# Patient Record
Sex: MALE | Race: WHITE
[De-identification: names, ages, dates, MRNs, and addresses within clinical notes are randomized per-mention and may not be internally consistent; named-entity substitution may affect disease eponyms.]

---

## 2019-08-05 ENCOUNTER — HOSPITAL ENCOUNTER (OUTPATIENT)
Dept: HOSPITAL 88 - OR | Age: 54
Discharge: HOME | End: 2019-08-05
Attending: ORTHOPAEDIC SURGERY
Payer: COMMERCIAL

## 2019-08-05 VITALS — DIASTOLIC BLOOD PRESSURE: 77 MMHG | SYSTOLIC BLOOD PRESSURE: 115 MMHG

## 2019-08-05 DIAGNOSIS — K51.90: ICD-10-CM

## 2019-08-05 DIAGNOSIS — R03.0: ICD-10-CM

## 2019-08-05 DIAGNOSIS — S83.241A: Primary | ICD-10-CM

## 2019-08-05 DIAGNOSIS — M89.9: ICD-10-CM

## 2019-08-05 PROCEDURE — 29882 ARTHRS KNE SRG MNISC RPR M/L: CPT

## 2019-08-05 PROCEDURE — 93005 ELECTROCARDIOGRAM TRACING: CPT

## 2019-08-05 NOTE — XMS REPORT
Continuity of Care Document

                             Created on: 2019



LONI GLEASON

External Reference #: [id number]

: 1965

Sex: Male



Demographics







                          Address                   [address] [place] [address] 

[place]  [address]

 

                          Home Phone                [phone]

 

                          Preferred Language        English

 

                          Marital Status            Unknown

 

                          Shinto Affiliation     Unknown

 

                          Race                      Unknown

 

                          Ethnic Group              Unknown





Author







                          Author                    QBE

 

                          Address                   Unknown

 

                          Phone                     Unavailable







Care Team Providers







                    Care Team Member Name    Role                Phone

 

                    [employer]    Unavailable         Unavailable



                                    



Problems

                    





                    Problem                            Status                            Onset Date     

                          Classification                            Date Reported       

                          Comments                            Source                    

 

                    Ulcerative colitis                                                        2019

                            Diagnosis                            2019          

                                                      [facility]                

    

 

                          Body mass index 25-29 - overweight                                                

                    2019                            Diagnosis                            2019

                                                        [facility]      

              

 

                    Depression screening                                                        2019

                            Diagnosis                            2019          

                                                      [facility]                

    

 

                    Acute meniscal tear, lateral                                                        

2019                            Diagnosis                            2019

                                                        [facility]      

              

 

                    Generalized anxiety disorder                                                        

2019                            Diagnosis                            2019

                                                        [facility]      

              

 

                    Chronic insomnia                                                        2019  

                          Diagnosis                            2019            

                                                      [facility]                  

  

 

                    Generalized Anxiety Disorder                                                        

2019                            Problem                            2019  

                                                      [facility]        

            

 

                    Ulcerative Colitis                                                        2019

                            Problem                            2019            

                                                      [facility]                  

  

 

                    Chronic Insomnia                                                        2019  

                          Problem                            2019              

                                                      [facility]                    



 

                    097 - OTH   UNSPEC SY                            Active                            2015

                                                                                       

                                         [facility] [place]                    

 

                    724.2 - LUMBAGO                            Active                            2015

                                                                                       

                                         [facility] [place]                    

 

                    Cough                            Active                                             

                    Problem                            2019                               

                                         [facility]                    

 

                    Sore throat                            Active                                       

                          Problem                            2019                       

                                                       [facility]                    

 

                          Elevated blood pressure reading                            Active                 

                                                Problem                            2019   

                                                       [facility]                

    

 

                    PND (Confirmed)                            Active                                   

                          Problem                            2019                   

                                                       [facility]                    

 

                    Sinusitis                            Active                                         

                          Problem                            2019                         

                                                       [facility]                    



                                                                                
                                                                                
                                                                                
                                                                     



Medications

                    





                    Medication                            Details                            Route      

                          Status                            Patient Instructions         

                          Ordering Provider                            Order Date           

                                        Source                    

 

                          benzonatate 200 MG Oral Capsule [Tessalon]                            200 mg=1 cap,

 PO, TID, X 10 day, # 30 cap, 0 Refill(s), Pharmacy: [facility]/pharmacy #[id number]         
                                                Active                                  

                                                      2019                         

                                         [facility]                    

 

                          Azithromycin 5 Day Dose Pack 250 mg oral tablet                            See Instructions,

 Take 2 tablets by mouth the first day then 1 tablet by mouth days 2-5., X 5 
day, # 6 tab, 0 Refill(s), Pharmacy: [facility]/pharmacy #[id number]                            

                            Active                                                   

                                                2019                            [facility]

                    

 

                                        Azelastine hydrochloride 0.137 MG/ACTUAT Metered Dose Nasal Spray [Astelin]     

                                        2 spray, NASAL, BID, # 1 ea, 0 Refill(s), Pharmacy: [facility]/pharmacy

 #[id number]                                                        Active                 

                                                                            2018          

                                        [facility]                    

 

                                        Codeine Phosphate 2 MG/ML / Guaifenesin 20 MG/ML Oral Solution                  

                                        10 mL, PO, Bedtime, PRN for cough, X 7 day, # 100 mL, 0 Refill(s)        

                                                      No Longer Active                     

                                                                            2018              

                                        [facility]                    

 

                          Oseltamivir 75 MG Oral Capsule [Tamiflu]                            75 mg, PO, Q12H,

 X 5 day, # 10 cap, 0 Refill(s)                                                    

                    No Longer Active                                                                   

                          2018                            [facility]              

      

 

                                        Azelastine hydrochloride 0.137 MG/ACTUAT Metered Dose Nasal Spray               

                                        azelastine 137 mcg (0.1 %) nasal spray aerosol Spray 1 spray every day

 by nasal route as needed for 30 days.                                             

                    Active                                                                      

                                                      [facility]                    



 

                          Escitalopram 10 MG Oral Tablet                            escitalopram 10 mg tablet

 take 1 tablet po daily                                                        Active

                                                                                       

                                        [facility]                    

 

                                        Acetaminophen 325 MG / Hydrocodone Bitartrate 5 MG Oral Tablet                  

                                        hydrocodone 5 mg-acetaminophen 325 mg tablet Take 1 tablet every 6 hours 

by oral route.                                                        Active         

                                                                                        

                                        [facility]                    

 

                          Prednisone 10 MG Oral Tablet                            prednisone 10 mg tablet Take

 1 tablet every day by oral route.                                                 

                    Active                                                                          

                                                      [facility]                    

 

                          Zolpidem tartrate 10 MG Oral Tablet                            zolpidem 10 mg tablet

 Take 1 tablet every day by oral route.                                            

                    [facility]                   

 



                                                                                
                                                                                
                                                                    



Allergies, Adverse Reactions, Alerts

                    





                    Substance                            Category                            Reaction   

                          Severity                            Reaction type           

                          Status                            Date Reported                     

                          Comments                            Source                    

 

                          No Known Medication Allergies                            Assertion                

                                                                            Drug allergy       

                                                                                       

                                        [facility]                    



                                                        



Immunizations

                    





                    Immunization                            Date Given                            Site  

                          Status                            Last Updated             

                          Comments                            Source                    

 

                          influenza virus vaccine, inactivated                            10/08/2018        

                                                completed                            [facility]             

       

 

                          influenza virus vaccine, inactivated                            10/01/2017        

                                                completed                            [facility]             

       



                                                             



Results







                                        No Data Provided for This Section



                    



Pathology Reports







                                        No Data Provided for This Section                    



                                                



Diagnostic Reports

                    





                    Report                            Value                            Date             

                                        Source                    

 

                          Spine lumbar wo contrast MRI                            MRI LUMBAR SPINE WITHOUT CONTRAST

 

 

COMPARISON: 2015 radiograph exam.

 

TECHNIQUE: Sagittal T1, sagittal T2 with fat saturation, axial T1 and axial T2 
images were obtained. No intravenous gadolinium was given.

 

FINDINGS: 

 

The paravertebral soft tissues are normal. 

 

The conus medullaris terminates at the L1 level. Multilevel thoracolumbar spine 
endplate Schmorl's nodes are identified, without marrow edema.

 

T12-L1: Unremarkable.

 

L1-L2: Unremarkable.

 

L2-L3: No central canal stenosis. There is mild bilateral foraminal and 
extraforaminal disc bulge with mild bilateral foraminal stenosis. No exiting 
nerve root impingement is present.

 

L3-L4: No central canal stenosis. Mild bilateral foraminal stenosis due to disc 
bulge encroachment is present. No significant mass effect on the exiting nerve 
roots.

 

L4-L5: Mild disc bulge is present. No central canal stenosis is present. Mild 
bilateral foraminal stenosis is present with disc bulge contacting the bilateral
 L4 exiting nerve roots.

 

L5-S1: Minimal disc bulge is seen without thecal sac stenosis. No foraminal 
stenosis.

 

 

 

IMPRESSION:

                                        1. Several levels of mild foraminal stenosis without significant mass effect on 

the exiting nerve roots. No focal disc herniation or significant central canal 
stenosis.

 

                            2015                             [facility]

                    

 

                          Spine lumbar 2 or 3 views DX                            EXAMINATION: Lumbar spine 

- 2 to 3 views

 

HISTORY: Lumbago.

 

FINDINGS: Frontal, lateral, and coned lateral views of the lumbar spine are 
performed without comparison.

 

The alignment is normal without listhesis. The intervertebral disc spaces are 
normal. The vertebral body heights are normal without compression fracture. The 
sacral ala appear intact.

 

IMPRESSION:

 

                                        1. Normal alignment and intervertebral disc spaces of the lumbar spine.

                            2015                             [facility] [place] 

                   



                                                                                
    



Consultation Notes

                    





                                        No Data Provided for This Section                    



                                                            



Discharge Summaries

                    





                                        No Data Provided for This Section                    



                                                            



History and Physicals

                    





                                        No Data Provided for This Section                    



                                                                



Vital Signs

                     





                    Vital Sign                            Value                            Date         

                          Comments                            Source                    

 

                    Weight                            106.455                             2019    

                                                       [facility]                 

   

 

                    Height                            190.5 cm                            2019    

                                                       [facility]                 

   

 

                    Heart Rate                            86                             2019     

                                                       [facility]                  

  

 

                    Respitory Rate                            16                             2019 

                                                        [facility]              

      

 

                    Temperature Oral (F)                            97.9 F                            2019

                                                         [facility]             

       

 

                    Systolic (mm Hg)                            151                             2019

                                                         [facility]             

       

 

                    Diastolic (mm Hg)                            94                             2019

                                                         [facility]             

       

 

                    BMI Calculated                            29.33                             2019

                                                         [facility]             

       

 

                    Diastolic (mm Hg)                            88                             2019

                                                        [facility]      

              

 

                    Height                            75                             2019         

                                                      [facility]               

     

 

                    Systolic (mm Hg)                            140                             2019

                                                        [facility]      

              

 

                    Weight                            222.6                             2019      

                                                      [facility]            

        

 

                    Systolic (mm Hg)                            110                             2018

                                                         [facility]             

       

 

                    Diastolic (mm Hg)                            73                             2018

                                                         [facility]             

       

 

                    Heart Rate                            96                             2018     

                                                       [facility]                  

  

 

                    Temperature Oral (F)                            98 F                            2018

                                                         [facility]             

       

 

                    BMI Calculated                            26.43                             2018

                                                         [facility]             

       

 

                    Weight                            95.909                             2018     

                                                       Medical Group                  

  

 

                    Height                            190.5 cm                            2018    

                                                       Medical Group                 

   



                                                                                
                                                                                
                                                                                
                                                                                
                                                        



Encounters

                    





                    Location                            Location Details                            Encounter

 Type                            Encounter Number                            Reason For

 Visit                            Attending Provider                            ADM Date

                            DC Date                            Status                

                                        Source                    

 

                          [facility] [place]                                              

                          Outpt Diag Services                            [id number]                

                                                Keyon Redduilar                             2015                                               

                                         [facility]                                          

                          Outpt Di[facility]                            [id number]            

                                                      Keyon Redduilar                          

                    2015                                    

                                        [facility] [place]                    

 

                                                                            Outpatient                  

                    [id number]                                                        RASHAAD QUEVEDO                             2016                                      

                          [facility]                 

   

 

                                                                            Outpatient                  

                    [id number]                                                        EVE HOWELL

                             2017                                              

                          [facility]                    

 

                                                                            Outpatient                  

                    [id number]                                                        EVE HOWELL

                             2018                                              

                          [facility]                                                     

                    Outpatient                            [id number]                                  

                                                        2018                       

                    01/10/2018                                                         [facility]

                    

 

                          [place] [facility]                               

                                        Keyon King MD: [address], [address], [place] [address], Ph. [phone]                            [id number]

                                                        Keyon King              

                    2019                                                              

                                        [facility]                    

 

                                                                            Outpatient                  

                    [id number]                                                        Eve Howell

                             2019                                              

                          [facility]                                                     

                    Outpatient                            [id number]                                  

                                                        2019                                                         [facility]

                    



                                                                                
                                                                                
        



Procedures

                    





                    Procedure                            Code                            Date           

                          Perfomer                            Comments                        

                                        Source                    

 

                          Knee joint operation                            [id number]                         

                                                                                                    

 Medical Group                    

 

                    Nose operation                            [id number]                                  

                                                                               Medical

 Group                    

 

                          Shoulder joint operations                            [id number]                    

                                                                                                   

                                         Medical Group                    

 

                    Tonsillectomy                            [id number]                                  

                                                                               Medical

 Group                    



                                                                                
                            



Assessment and Plan

                    





                                        No Data Provided for This Section                    



                                     



Plan of Care







                                        No Data Provided for This Section                    



                                                                



Social History

                    





                    Social History                            Date                            Source    

                

 

                                                                                    Smoking Status

             Never Smoker

                                   

                                 2019                            [facility]                    

 

                                        Social History TypeResponse

Substance Abuse

Use: None.

Exercise

Exercise frequency: Daily.

Employment/School

Status: Employed.  Work/School description: procurement.

Alcohol

Current

Smoking Status

Never smoker; Exposure to Tobacco Smoke None; Cigarette Smoking Last 365 Days 
No; Reg Smoking Cessation Counseling No

entered on: 2018                             Medical Group   

                 

 

                          No data available for this section                            2015          

                                         [facility] [place]                    

 

                          No data available for this section                            2015          

                                         [facility] [place]                    



                                                                                
                                    



Family History

                    





                                        No Data Provided for This Section                    



                                                            



Advance Directives

                    





                                        No Data Provided for This Section                    



                                                            



Functional Status

                    





                                        No Data Provided for This Section

## 2019-08-05 NOTE — XMS REPORT
Summary of Care: 7/27/15 - 7/27/15

                             Created on: 2112



LONI GLEASON

External Reference #: [id number]

: 1965

Sex: Male



Demographics







                          Address                   [address] [place]  [address]-

 

                          Home Phone                [phone]

 

                          Preferred Language        English

 

                          Marital Status            

 

                          Muslim Affiliation     Unknown

 

                          Race                      White/

 

                          Ethnic Group              Non-





Author







                          Author                    [facility]

 

                          Address                   Unknown

 

                          Phone                     Unavailable







Encounter





HQ Encntr_alias(FIN) [id number] Date(s): 7/27/15 - 7/27/15

[facility] [address] [place] [address]- [phone]

Discharge Disposition: Home

Attending Physician: Keyon King MD





Vital Signs





No data available for this section



Problem List





No data available for this section



Allergies, Adverse Reactions, Alerts





No data available for this section



Medications





No data available for this section



Results





No data available for this section



Immunizations





No data available for this section



Procedures





No data available for this section



Social History





No data available for this section



Assessment and Plan





No data available for this section

## 2019-08-05 NOTE — XMS REPORT
Summary of Care: 18 - 18

                             Created on: 2029



LONI GLEASON

External Reference #: [id number]

: 1965

Sex: Male



Demographics







                          Address                   [address] [place]  [address]-

 

                          Home Phone                [phone]

 

                          Preferred Language        English

 

                          Marital Status            Unknown

 

                          Zoroastrianism Affiliation     Unknown

 

                          Race                      White

 

                                        Additional Race(s)  

 

                          Ethnic Group              Non-





Author







                          Author                    [facility]

 

                          Organization              [facility] [place] [address]

 

                          Address                   Unknown

 

                          Phone                     Unavailable







Encounter





HQ Bernard_zack(FIN) [id number] Date(s): 18 - 18

[facility] [address] [facility] [address] [place] [address]- [phone]

Discharge Disposition: Home or Self Care





Vital Signs







 



                           Most recent to            1



                                         oldest [Reference 



                                         Range]: 

 

 



                           Height                    190.5 cm



                                         (18 3:08 PM)

 

 



                           Temperature Oral          98 DegF



                           [96.4-99.1 DegF]          (18 3:08 PM)

 

 



                           Blood Pressure            110/73 mmHg



                           [/60-90 mmHg]       (18 3:08 PM)

 

 



                           Peripheral Pulse          96 bpm



                           Rate [ bpm]         (18 3:08 PM)

 

 



                           Weight                    95.909 kg



                                         (18 3:08 PM)

 

 



                           Body Mass Index           26.43 m2



                                         (18 3:08 PM)







Problem List







    



              Condition     Effective Dates     Status       Health Status     Informant

 

    



                           Cough(Confirmed)          Active  

 

    



                           Sore                      Active  



                                         throat(Confirmed)    







Allergies, Adverse Reactions, Alerts







   



                 Substance       Reaction        Severity        Status

 

   



                           NKDA                      Active







Medications





Astelin 137 mcg/inh nasal spray

2 spray, NASAL, BID, # 1 ea, 0 Refill(s), Pharmacy: [facility]/pharmacy #[id number]

Start Date: 18

Stop Date: 18

Status: Ordered



codeine-guaiFENesin 10 mg-100 mg/5 mL oral syrup

10 mL, PO, Bedtime, PRN for cough, X 7 day, # 100 mL, 0 Refill(s)

Start Date: 18

Stop Date: 18

Status: Ordered



TamiFLU 75 mg oral capsule

75 mg, PO, Q12H, X 5 day, # 10 cap, 0 Refill(s)

Start Date: 18

Stop Date: 18

Status: Ordered



Results





No data available for this section



Immunizations





Given and Recorded





   



                 Vaccine         Date            Status          Refusal Reason

 

   



                     influenza virus vaccine, inactivated     10/1/17             Recorded 







Procedures







    



              Procedure     Date         Related Diagnosis     Body Site     Status

 

    



                           Knee joint operation        Completed

 

    



                           Nose operation            Completed

 

    



                           Shoulder joint operations        Completed

 

    



                           Tonsillectomy             Completed







Social History







 



                           Social History Type       Response

 

 



                           Substance Abuse           Use: None.

 

 



                           Exercise                  Exercise frequency: Daily.

 

 



                           Employment/School         Status: Employed.  Work/School description: procurement.

 

 



                           Alcohol                   Current

 

 



                           Smoking Status            Never smoker; Exposure to Tobacco Smoke None; Cigarette Smoking

 Last 365



                                         Days No; Reg Smoking Cessation Counseling No



                                         entered on: 18







Assessment and Plan





No data available for this section

## 2019-08-05 NOTE — XMS REPORT
Summary of Care: 3/12/15 - 3/12/15

                             Created on: 10/04/2033



LONI GLEASON

External Reference #: [id number]

: 1965

Sex: Male



Demographics







                          Address                   [address] [place] [address]  [address]

 

                          Home Phone                [phone]

 

                          Preferred Language        English

 

                          Marital Status            Unknown

 

                          Faith Affiliation     Unknown

 

                          Race                      White/

 

                          Ethnic Group              Non-





Author







                          Organization              Unknown

 

                          Address                   Unknown

 

                          Phone                     Unavailable







Encounter





HQ Encntr_alias(FIN) [id number] Date(s): 3/12/15 - 3/12/15

[facility] - [address] [phone]

Discharge Disposition: Home

Physician Attending: Keyon King MD





Vital Signs





No data available for this section



Problem List





No data available for this section



Allergies, Adverse Reactions, Alerts





No data available for this section



Medications





No data available for this section



Results





No data available for this section



Immunizations





No data available for this section



Procedures





No data available for this section



Social History





No data available for this section



Assessment and Plan





No data available for this section

## 2019-08-05 NOTE — XMS REPORT
Summary of Care: 19 - 19

                             Created on: 2087



LONI GLEASON

External Reference #: [id number]

: 1965

Sex: Male



Demographics







                          Address                   [address] [place] 

[place]  [address]

 

                          Home Phone                [phone]

 

                          Preferred Language        English

 

                          Marital Status            Unknown

 

                          Amish Affiliation     Unknown

 

                          Race                      White

 

                                        Additional Race(s)  

 

                          Ethnic Group              Non-





Author







                          Author                    [facility]

 

                          Organization              [facility] [address]

 

                          Address                   Unknown

 

                          Phone                     Unavailable







Encounter





HQ Bernard_zack(FIN) [id number] Date(s): 19 - 19

[facility] [address] [place] [address]- [phone]

Discharge Disposition: Home or Self Care





Vital Signs







 



                           Most recent to            1



                                         oldest [Reference 



                                         Range]: 

 

 



                           Height                    190.5 cm



                                         (19 1:51 PM)

 

 



                           Temperature Oral          97.9 DegF



                           [96.4-99.1 DegF]          (19 1:51 PM)

 

 



                           Blood Pressure            151/94 mmHg



                           [/60-90 mmHg]       *HI*



                                         (19 1:51 PM)

 

 



                           Respiratory Rate          16 BRMIN



                           [14-20 BRMIN]             (19 1:51 PM)

 

 



                           Peripheral Pulse          86 bpm



                           Rate [ bpm]         (19 1:51 PM)

 

 



                           Weight                    106.455 kg



                                         (19 1:51 PM)

 

 



                           Body Mass Index           29.33 m2



                                         (19 1:51 PM)







Problem List







    



              Condition     Effective Dates     Status       Health Status     Informant

 

    



                           Cough(Confirmed)          Active  

 

    



                           Elevated blood            Active  



                                         pressure    



                                         reading(Confirmed)    

 

    



                           PND (post-nasal           Active  



                                         drip)(Confirmed)    

 

    



                           Sinusitis(Confirmed)      Active  

 

    



                           Sore                      Active  



                                         throat(Confirmed)    







Allergies, Adverse Reactions, Alerts





No Known Medication Allergies



Medications





Azithromycin 5 Day Dose Pack 250 mg oral tablet

See Instructions, Take 2 tablets by mouth the first day then 1 tablet by mouth d
ays 2-5., X 5 day, # 6 tab, 0 Refill(s), Pharmacy: [facility]/pharmacy #[id number]

Start Date: 19

Stop Date: 19

Status: Ordered



Tessalon 200 mg oral capsule

200 mg=1 cap, PO, TID, X 10 day, # 30 cap, 0 Refill(s), Pharmacy: [facility]/pharmacy #
[id number]

Start Date: 19

Stop Date: 19

Status: Ordered



Results





No data available for this section



Immunizations





Given and Recorded





   



                 Vaccine         Date            Status          Refusal Reason

 

   



                     influenza virus vaccine, inactivated     10/8/18             Recorded 

 

   



                     influenza virus vaccine, inactivated     10/1/17             Recorded 







Procedures







    



              Procedure     Date         Related Diagnosis     Body Site     Status

 

    



                           Knee joint operation        Completed

 

    



                           Nose operation            Completed

 

    



                           Shoulder joint operations        Completed

 

    



                           Tonsillectomy             Completed







Social History







 



                           Social History Type       Response

 

 



                           Substance Abuse           Use: None.

 

 



                           Exercise                  Exercise frequency: Daily.

 

 



                           Employment/School         Status: Employed.  Work/School description: procurement.

 

 



                           Alcohol                   Current

 

 



                           Smoking Status            Never smoker; Exposure to Tobacco Smoke None; Cigarette Smoking

 Last 365



                                         Days No; Reg Smoking Cessation Counseling No



                                         entered on: 19







Assessment and Plan





No data available for this section

## 2019-08-05 NOTE — XMS REPORT
Patient Summary Document

                             Created on: 2019



LONI GLEASON

External Reference #: [id number]

: 1965

Sex: Male



Demographics







                          Address                   [address] [place] [address]  [address]

 

                          Home Phone                [phone]

 

                          Preferred Language        Unknown

 

                          Marital Status            Unknown

 

                          Scientologist Affiliation     Unknown

 

                          Race                      Unknown

 

                          Ethnic Group              Unknown





Author







                          Author                    [facility]

 

                          Address                   Unknown

 

                          Phone                     Unavailable







Care Team Providers







                    Care Team Member Name    Role                Phone

 

                          Unavailable               Unavailable







Problems

This patient has no known problems.



Allergies, Adverse Reactions, Alerts

This patient has no known allergies or adverse reactions.



Medications

This patient has no known medications.

## 2019-08-05 NOTE — XMS REPORT
Clinical Summary

                             Created on: 2019



Pieter Thomas

External Reference #: [id number]

: 1965

Sex: Male



Demographics







                          Address                   [address] 

[place]  [address]

 

                          Home Phone                [phone]

 

                          Preferred Language        English

 

                          Marital Status            

 

                          Pentecostal Affiliation     [id number]

 

                          Race                      White

 

                          Ethnic Group              Non-





Author







                          Author                    [facility]ell [facility]

 

                          Organization              [facility]

 

                          Address                   Unknown

 

                          Phone                     Unavailable







Support







                Name            Relationship    Address         Phone

 

                    Urvashi Thomas        [employer]                [address]

[place]  [address]                      [phone]







Care Team Providers







                    Care Team Member Name    Role                Phone

 

                    Keyon King MD    PCP                 [phone]







Allergies

No Known Allergies



Medications







                          End Date                  Status



              Medication     Sig          Dispensed     Refills      Start  



                                         Date  

 

                                                    Active



                 APRISO 0.375 gram 24 hr     TAKE 4          3                 



                     capsule             CAPSULES BY         6  



                                         MOUTH EVERY     



                                         DAY     

 

                                                    Active



                 HYDROcodone-acetaminophen     TAKE 1 TABLET      0                 



                     (XODOL) 5-300 mg per     BY MOUTH            8  



                           tablet                    EVERY 4 TO 6     



                                         HOURS AS     



                                         NEEDED FOR     



                                         PAIN     

 

                                                    Active



              tadalafil (CIALIS) 20 mg     Take 1 tablet     27 tablet     2              



                     tablet              (20 mg total)       8  



                                         by mouth     



                                         daily as     



                                         needed for     



                                         erectile     



                                         dysfunction.     

 

                                                    Active



              cetirizine-pseudoepHEDrin     Take 1 tablet     60 tablet     11             



                     e (ZyrTEC-D) 5-120 mg per     by mouth 2          8  



                           12 hr tablet              (two) times a     



                                         day.     

 

                                                    Active



              atorvastatin (LIPITOR) 20     TAKE 1 TABLET     90 tablet     1              



                     MG tablet           BY MOUTH            8  



                                         EVERY DAY     

 

                                                    Active



              dextromethorphan-guaifene     Take 1 tablet     28 each      0              



                     sin (MUCINEX DM)      by mouth 2          8  



                           mg tablet extended        (two) times a     



                           release 12 hr             day as needed     



                                         (congestion     



                                         post nasal     



                                         drip).     

 

                          2019                Active



              clotrimazole-betamethason     Apply        30 g         0              



                     e (LOTRISONE) 1-0.05 %     topically 2         8  



                           cream                     (two) times a     



                                         day.     

 

                          2018                Discontinued



              fluticasone (FLONASE) 50     USE 2 SPRAYS     48 mL        1              



                     mcg/actuation nasal spray     EACH NOSTRIL        7  



                                         EVERYDAY     

 

                          2018                Discontinued



              atorvastatin (LIPITOR) 20     TAKE 1 TABLET     90 tablet     0            05/15/201  



                     MG tablet           BY MOUTH            8  



                                         EVERY DAY     

 

                          2018                Discontinued



              dextromethorphan-guaifene     Take 1 tablet     28 each      0              



                     sin (MUCINEX DM)      by mouth 2          8  



                           mg tablet extended        (two) times a     



                           release 12 hr             day as needed     



                                         (congestion     



                                         post nasal     



                                         drip).     

 

                          2018                Discontinued



              zolpidem (AMBIEN) 10 mg     TAKE 1 TABLET     30 tablet     0              



                     tablet              BY MOUTH            8  



                                         NIGHTLY AS     



                                         NEEDED FOR     



                                         SLEEP     

 

                          2018                Discontinued



              zolpidem (AMBIEN) 10 mg     TAKE 1 TABLET     30 tablet     0              



                     tablet              BY MOUTH            8  



                                         NIGHTLY AS     



                                         NEEDED FOR     



                                         SLEEP     

 

                          2018                Discontinued



                     UNABLE TO FIND      Zirtec D            0   

 

                          10/09/2018                Discontinued



              escitalopram (LEXAPRO) 10     Take 1 tablet     30 tablet     2              



                     MG tablet           (10 mg total)       8  



                                         by mouth     



                                         daily for 90     



                                         days.     

 

                          2018                Discontinued



              clonAZEPAM (KlonoPIN) 0.5     Take 1 tablet     40 tablet     0              



                     MG tablet           (0.5 mg             8  



                                         total) by     



                                         mouth 3     



                                         (three) times     



                                         a day as     



                                         needed for     



                                         anxiety     



                                         (insomnia)     



                                         for up to 30     



                                         days.     

 

                          2018                



              promethazine-codeine     Take 5 mL by     120 mL       0              



                     (PHENERGAN with CODEINE)     mouth every 6       8  



                           6.25-10 mg/5 mL syrup     (six) hours     



                                         as needed for     



                                         cough for up     



                                         to 10 days.     

 

                          10/09/2018                Discontinued



              dextromethorphan-guaifene     Take 1 tablet     28 each      0              



                     sin (MUCINEX DM)      by mouth 2          8  



                           mg tablet extended        (two) times a     



                           release 12 hr             day as needed     



                                         (congestion     



                                         post nasal     



                                         drip).     

 

                          2018                



              levoFLOXacin (LEVAQUIN)     Take 1 tablet     5 tablet     0              



                     750 MG tablet       (750 mg             8  



                                         total) by     



                                         mouth daily     



                                         for 5 days.     

 

                          2018                Discontinued



              atorvastatin (LIPITOR) 20     TAKE 1 TABLET     90 tablet     0            /201  



                     MG tablet           BY MOUTH            8  



                                         EVERY DAY     

 

                          2018                Discontinued



              clonAZEPAM (KlonoPIN) 0.5     TAKE 1 TABLET     40 tablet     0            201  



                     MG tablet           BY MOUTH 3          8  



                                         TIMES A DAY     



                                         AS NEEDED FOR     



                                         ANXIETY/INSOM     



                                         MAHENDRA     

 

                          10/20/2018                



              clonAZEPAM (KlonoPIN) 0.5     TAKE 1 TABLET     30 tablet     0              



                     MG tablet           BY MOUTH 3          8  



                                         TIMES A DAY     



                                         AS NEEDED FOR     



                                         ANXIETY/INSOM     



                                         MAHENDRA     

 

                          2018                Discontinued



                     amoxicillin (AMOXIL) 500       0                   10/08/201  



                           MG capsule                8  

 

                          10/14/2018                



              azithromycin (ZITHROMAX)     Take 2       6 tablet     0            10/09/201  



                     250 MG tablet       tablets the         8  



                                         first day,     



                                         then 1 tablet     



                                         daily for 4     



                                         days.     

 

                          2018                Discontinued



              dextromethorphan-guaifene     Take 1 tablet     28 each      0            10/09/201  



                     sin (MUCINEX DM)      by mouth 2          8  



                           mg tablet extended        (two) times a     



                           release 12 hr             day as needed     



                                         (congestion     



                                         post nasal     



                                         drip).     

 

                          10/19/2018                



              promethazine-codeine     Take 5 mL by     120 mL       0            10/09/201  



                     (PHENERGAN with CODEINE)     mouth every 6       8  



                           6.25-10 mg/5 mL syrup     (six) hours     



                                         as needed for     



                                         cough for up     



                                         to 10 days.     

 

                          2018                Discontinued



              escitalopram (LEXAPRO) 10     Take 1 tablet     30 tablet     2            10/09/201  



                     MG tablet           (10 mg total)       8  



                                         by mouth     



                                         daily for 90     



                                         days.     

 

                          2018                Discontinued



              escitalopram (LEXAPRO) 10     Take 1 tablet     90 tablet     0              



                     MG tablet           (10 mg total)       8  



                                         by mouth     



                                         daily for 90     



                                         days.     

 

                          2018                



              clonAZEPAM (KlonoPIN) 0.5     Take 1 tablet     30 tablet     1              



                     MG tablet           (0.5 mg             8  



                                         total) by     



                                         mouth 2 (two)     



                                         times a day     



                                         as needed for     



                                         anxiety     



                                         (insomnia)     



                                         for up to 30     



                                         days.     

 

                          2018                



              mupirocin (BACTROBAN) 2 %     Apply        30 g         0              



                     ointment            topically to        8  



                                         left nostril     



                                         twice a day     

 

                          2018                Discontinued



              mometasone (ELOCON) 0.1 %     Apply        30 g         0              



                     cream               topically to        8  



                                         left leg rash     



                                         once daily     

 

                          2018                



              ciprofloxacin (CIPRO) 500     Take 1 tablet     16 tablet     0              



                     MG tablet           (500 mg             8  



                                         total) by     



                                         mouth 2 (two)     



                                         times a day     



                                         for 8 days.     

 

                          2018                



              metroNIDAZOLE (FLAGYL)     Take 1 tablet     24 tablet     0              



                     500 MG tablet       (500 mg             8  



                                         total) by     



                                         mouth 3     



                                         (three) times     



                                         a day for 8     



                                         days.     

 

                          2018                



              acetaminophen-codeine     Take 1 tablet     5 tablet     0              



                     (TYLENOL WITH CODEINE #3)     by mouth            8  



                           300-30 mg per tablet      every 6 (six)     



                                         hours as     



                                         needed for     



                                         moderate pain     



                                         for up to 5     



                                         doses.     

 

                          2019                



              azithromycin (ZITHROMAX)     Take 2       6 tablet     0              



                     250 MG tablet       tablets the         8  



                                         first day,     



                                         then 1 tablet     



                                         daily for 4     



                                         days.     

 

                          2019                



              promethazine-codeine     Take 5 mL by     120 mL       0              



                     (PHENERGAN with CODEINE)     mouth every 6       8  



                           6.25-10 mg/5 mL syrup     (six) hours     



                                         as needed for     



                                         cough for up     



                                         to 10 days.     

 

                          2019                



              escitalopram (LEXAPRO) 10     Take 1 tablet     90 tablet     1              



                     MG tablet           (10 mg total)       8  



                                         by mouth     



                                         daily for 90     



                                         days.     

 

                          2019                



              zolpidem (AMBIEN) 10 mg     Take 1 tablet     30 tablet     1              



                     tablet              (10 mg total)       8  



                                         by mouth     



                                         nightly as     



                                         needed for     



                                         sleep for up     



                                         to 30 days.     









                                        Status



            Hospital, Clinic, or     Ordered Dose     Route      Frequency     Start      End Date 



                           Other Facility            Date  



                                         Administered Medication      

 

                                        Ended



            methylPREDNISolone     40 mg      IM         once       20 



                     acetate (DEPO-MEDROL)        18                  8 



                                         injection 40      



                                         mgIndications: Acute      



                                         frontal sinusitis,      



                                         recurrence not specified      

 

                                        Ended



            methylPREDNISolone     40 mg      IM         once       10/09/20     10/09/201 



                     acetate (DEPO-MEDROL)        18                  8 



                                         injection 40      



                                         mgIndications: Acute      



                                         bronchitis, unspecified      



                                         organism      







Active Problems







 



                           Problem                   Noted Date

 

 



                           Colitis                   12/10/2018

 

 



                           Left tennis elbow         05/15/2017

 

 



                           Right elbow pain          2017

 

 



                           Lateral epicondylitis of right elbow     2017

 

 



                           Lateral epicondylitis of both elbows     10/03/2016







Encounters







                          Care Team                 Description



                     Date                Type                Specialty  

 

                                        



Keyon King MD                



                     2019          Refill              Family Medicine  

 

                                        



Keyon King MD                



                     2019          Refill              Family Medicine  

 

                                        



Keyon King MD                



                     2019          Refill              Family Medicine  

 

                                        



Keyon King MD               Psychophysiological insomnia (Primary Dx); 

Tinea corporis; 

Acute secretory otitis media of both ears; 

Anemia due to other cause, not classified; 

Allergic rhinitis, unspecified seasonality, unspecified trigger; 

Panic attack as reaction to stress; 

Mixed hyperlipidemia; 

Diverticulitis of large intestine without perforation or abscess without 
bleeding; 

Ulcerative colitis without complications, unspecified location (HCC); 

Weight loss; 

BMI 27.0-27.9,adult



                     2018          Office Visit        Family Medicine  

 

                                        



Aleksey Tan MD Bhatt, Sandeep, MD                      Colitis (Primary Dx)



                     2018          [facility]  



                           -                         Encounter   



                                         2018    

 

                                        



Keyon King MD                



                     2018          Refill              Family Medicine  

 

                                        



Rama Luna MA                       



                     2018          Telephone           [facility] Keyon Moy MD               Elevated transaminase level (Primary Dx)



                     2018          Orders Only         Family Keyon Moy MD               Routine check-up (Primary Dx); 

Mixed hyperlipidemia; 

Panic attack as reaction to stress; 

Dermatitis; 

Cellulitis of nostril; 

Psychophysiological insomnia; 

BMI 27.0-27.9,adult; 

Elevated blood pressure reading without diagnosis of hypertension; 

Erectile dysfunction, unspecified erectile dysfunction type; 

Chronic sinusitis, unspecified location; 

Lateral epicondylitis of both elbows; 

Acute secretory otitis media of both ears; 

Pharyngitis, unspecified etiology; 

Nocturia; 

Drug-induced constipation; 

Weight gain, abnormal



                     2018          Office Visit        Family Rama Morocho MA                       



                     2018          Telephone           [facility] Keyon Moy MD                



                     10/15/2018          Refill              Family Keyon Moy MD               Acute bronchitis, unspecified organism (Primary Dx); 

Pharyngitis, unspecified etiology; 

Other fatigue; 

Panic attack as reaction to stress; 

Insomnia, unspecified type; 

Other specified anxiety disorders; 

Left ear pain



                     10/09/2018          Office Visit        Family Keyon Moy MD                



                     2018          Refill              Family Keyon Moy MD                



                     2018          Refill              [facility] Keyon Moy MD                



                     2018          Telephone           [facility] Keyon Moy MD                



                     2018          Refill              Family Medicine  

 

                                        



Keyon King MD                



                     2018          Telephone           Family Medicine  

 

                                        



Keyon Kign MD               Acute frontal sinusitis, recurrence not specified (Primary

 Dx); 

Acute secretory otitis media of both ears; 

Pharyngitis, unspecified etiology; 

Anxiety; 

Psychophysiological insomnia; 

Panic attacks; 

Other fatigue



                     2018          Office Visit        Family Medicine  

 

                                        



Keyon King MD                



                     2018          Refill              Family Medicine  



after 2018



Family History







   



                 Medical History     Relation        Name            Comments

 

   



                           Diabetes                  Brother  

 

   



                           Hypertension              Brother  

 

   



                     Diabetes            Father              Flo Thomas 

 

   



                     Hearing loss        Father              Flo Thomas 

 

   



                     Heart disease       Father              Flo Thomas 

 

   



                 Stroke          Father          Flo          2002



                                         [place] 

 

   



                 Cancer          Mother          4[address] Stage       2015



                                         Breast 



                                         Cancer 

 

   



                     Diabetes            Paternal            Lenin Thomas 



                                         Grandfather  

 

   



                 Stroke          Paternal        Lenin Thomas     1976



                                         Grandfather  

 

   



                 Miscarriages /     Paternal        Alsysa       late 1930's



                     Stillbirths         Grandmother         William 

 

   



                 Vision loss     Paternal        Alyssa       Legally blind



                           Grandmother               William 









   



                 Relation        Name            Status          Comments

 

   



                                         Brother   

 

   



                           Father                    Flo Thomas  

 

   



                           Mother                    4th Stage  



                                         Breast Cancer  

 

   



                           Paternal Grandfather      Phelpsdada Thomas  

 

   



                           Paternal Grandmother      Alyssa Thomas  







Social History







                                        Date



                 Tobacco Use     Types           Packs/Day       Years Used 

 

                                         



                                         Never Smoker    

 

    



                                         Smokeless Tobacco: Never   



                                         Used   









   



                 Alcohol Use     Drinks/Week     oz/Week         Comments

 

   



                     Yes                 2 Glasses of        Socially



                                         wine  



                                         2 Cans of  



                                         beer  









 



                           Sex Assigned at Birth     Date Recorded

 

 



                                         Not on file 









                                        Industry



                           Job Start Date            Occupation 

 

                                        Not on file



                           Not on file               Not on file 









                                        Travel End



                           Travel History            Travel Start 

 





                                         No recent travel history available.







Last Filed Vital Signs







                                        Time Taken



                           Vital Sign                Reading 

 

                                        2018  9:40 AM CST



                           Blood Pressure            136/88 

 

                                        2018  9:40 AM CST



                           Pulse                     98 

 

                                        2018  9:40 AM CST



                           Temperature               36.7 C (98.1 F) 

 

                                        2018 10:49 AM CST



                           Respiratory Rate          15 

 

                                        2018  9:40 AM CST



                           Oxygen Saturation         100% 

 

                                        -



                           Inhaled Oxygen            - 



                                         Concentration  

 

                                        2018  9:40 AM CST



                           Weight                    99.8 kg (220 lb) 

 

                                        2018  9:40 AM CST



                           Height                    190.5 cm (6' 3") 

 

                                        2018  9:40 AM CST



                           Body Mass Index           27.5 







Plan of Treatment







   



                 Health Maintenance     Due Date        Last Done       Comments

 

   



                           COLONOSCOPY SCREENING     2017  

 

   



                           SHINGLES VACCINES (#1)     2017  

 

   



                           INFLUENZA VACCINE         2019  







Procedures







                                        Comments



                 Procedure Name     Priority        Date/Time       Associated Diagnosis 

 

                                        



Results for this procedure are in the results section.



                     HC COMPLETE BLD COUNT     Routine             2018  



                           W/AUTO DIFF               5:19 AM CST  

 

                                        



Results for this procedure are in the results section.



                     ESTIMATED GFR       Routine             2018  



                                         5:36 AM CST  

 

                                        



Results for this procedure are in the results section.



                     BASIC METABOLIC PANEL     Routine             2018  



                                         5:36 AM CST  

 

                                        



Results for this procedure are in the results section.



                     HC COMPLETE BLD COUNT     Routine             2018  



                           W/AUTO DIFF               5:36 AM CST  

 

                                        



Results for this procedure are in the results section.



                     GASTROINTESTINAL PANEL     Routine             12/10/2018  



                                         11:52 AM CST  

 

                                        



Results for this procedure are in the results section.



                     ESTIMATED GFR       Routine             12/10/2018  



                                         5:02 AM CST  

 

                                        



Results for this procedure are in the results section.



                     COMPREHENSIVE METABOLIC     Routine             12/10/2018  



                           PANEL                     5:02 AM CST  

 

                                        



Results for this procedure are in the results section.



                     HC COMPLETE BLD COUNT     Routine             12/10/2018  



                           W/AUTO DIFF               5:02 AM CST  

 

                                        



Results for this procedure are in the results section.



                     CT ABDOMEN PELVIS W     STAT                2018  



                           CONTRAST                  11:59 PM CST  

 

                                        



Results for this procedure are in the results section.



                     ECG 12-LEAD         Routine             2018  



                                         10:16 PM CST  

 

                                        



Results for this procedure are in the results section.



                     ECG ED PRELIMINARY     Routine             2018  



                           INTERPRETATION            10:14 PM CST  

 

                                        



Results for this procedure are in the results section.



                     ESTIMATED GFR       STAT                2018  



                                         9:46 PM CST  

 

                                        



Results for this procedure are in the results section.



                     URINALYSIS SCREEN AND     STAT                2018  



                           MICROSCOPY, WITH REFLEX      9:46 PM CST  



                                         TO CULTURE    

 

                                        



Results for this procedure are in the results section.



                     LIPASE LEVEL        STAT                2018  



                                         9:46 PM CST  

 

                                        



Results for this procedure are in the results section.



                     AMYLASE LEVEL       STAT                2018  



                                         9:46 PM CST  

 

                                        



Results for this procedure are in the results section.



                     COMPREHENSIVE METABOLIC     STAT                2018  



                           PANEL                     9:46 PM CST  

 

                                        



Results for this procedure are in the results section.



                     HC COMPLETE BLD COUNT     STAT                2018  



                           W/AUTO DIFF               9:46 PM CST  

 

                                        



Results for this procedure are in the results section.



                 URINALYSIS, COMPLETE,     Routine         2018      Erectile dysfunction, 



                     WITH REFLEX TO CULTURE      11:12 AM CST        unspecified erectile 



                                         dysfunction type 

 

                                        



Results for this procedure are in the results section.



                 TESTOSTERONE LEVEL, FREE     Routine         2018      Erectile dysfunction, 



                     AND TOTAL, MALE      11:12 AM CST        unspecified erectile 



                                         dysfunction type 

 

                                        



Results for this procedure are in the results section.



                 TSH REFLEX TO T4F     Routine         2018      Erectile dysfunction, 



                           11:12 AM CST              unspecified erectile 



                                         dysfunction type 

 

                                        



Results for this procedure are in the results section.



                 PROSTATE SPECIFIC ANTIGEN     Routine         2018      Erectile dysfunction, 



                           11:12 AM CST              unspecified erectile 



                                         dysfunction type 

 

                                        



Results for this procedure are in the results section.



                 LIPID PANEL     Routine         2018      Mixed hyperlipidemia 



                                         11:12 AM CST  

 

                                        



Results for this procedure are in the results section.



                 COMPREHENSIVE METABOLIC     Routine         2018      Mixed hyperlipidemia 



                           PANEL                     11:12 AM CST  

 

                                        



Results for this procedure are in the results section.



                 CBC WITH PLATELET AND     Routine         2018      Routine check-up 



                           DIFFERENTIAL              11:12 AM CST  

 

                                        



Results for this procedure are in the results section.



                 CREATINE KINASE, TOTAL     Routine         2018      Mixed hyperlipidemia 



                           (CPK)                     11:12 AM CST  



after 2018



Results

* CBC with platelet and differential (2018  5:19 AM CST)



Only the most recent of 5 results within the time period is included.





    



              Component     Value        Ref Range     Performed At     Pathologist



                                         Signature

 

    



                 WBC             5.74            4.50 - 11.00 k/uL     [facility] 

 

    



                 RBC             4.34 (L)        4.40 - 6.00 m/uL     [facility] 

 

    



                 HGB             13.2 (L)        14.0 - 18.0 g/dL     [facility] 

 

    



                 HCT             39.8 (L)        41.0 - 51.0 %     [facility] 

 

    



                 MCV             91.7            82.0 - 100.0 fL     [facility] 

 

    



                 MCH             30.4            27.0 - 34.0 pg     [facility] 

 

    



                 MCHC            33.2            31.0 - 37.0 g/dL     [facility] 

 

    



                 RDW - SD        45.5            37.0 - 55.0 fL     [facility] 

 

    



                 MPV             9.1             8.8 - 13.2 fL     [facility] 

 

    



                 Platelet count     170             150 - 400 k/uL     [facility] 

 

    



                 Nucleated RBC     0.00            /100 WBC        [facility] 

 

    



                 Neutrophils     65.8            39.0 - 69.0 %     [facility] 

 

    



                 Lymphocytes     16.9 (L)        25.0 - 45.0 %     [facility] 

 

    



                 Monocytes       13.4 (H)        0.0 - 10.0 %     [facility] 

 

    



                 Eosinophils     3.1             0.0 - 5.0 %     [facility] 

 

    



                 Basophils       0.3             0.0 - 1.0 %     [facility] 













                                         Specimen

 





                                         Blood









   



                 Performing Organization     Address         [facility]     Phone Number

 

   



                     [address] [place] [address] 



                                         PATHOLOGY AND GENOMIC   



                                         MEDICINE   

 

   



                     [address]      [address]   





* Estimated GFR (2018  5:36 AM CST)



Only the most recent of 3 results within the time period is included.





    



              Component     Value        Ref Range     Performed At     Pathologist



                                         Signature

 

    



                 Estimated GFR     69              mL/min/1.73 m2     [place] 



                           Comment:                  [facility] 



                                         rpretation   



                                         G1   



                                         >=90 Normal or high   



                                         G2   



                                         60-89Mildly decreased   



                                         [id number]   



                                         Mildly to moderately   



                                         decreased   



                                         [id number]   



                                         Moderately to severely   



                                         decreased   



                                         G4   



                                         15-29Severely   



                                         decreased   



                                         G5   



                                         <15Kidney failure   



                                         The eGFR was calculated using   



                                         the Chronic Kidney Disease   



                                         Epidemiology Collaboration   



                                         (CKD-EPI) equation.   



                                         Interpretation is based on   



                                         recommendations of the   



                                         National Kidney   



                                         Foundation-Kidney Disease   



                                         Outcomes Quality   



                                         Initiative (NKF-KDOQI)   



                                         published in 2014.   













                                         Specimen

 





                                         Plasma specimen









   



                 Performing Organization     Address         [facility]     Phone Number

 

   



                     [address] [place] [address] 



                                         PATHOLOGY AND [facility]   



                                         MEDICINE   

 

   



                     [address]      [address]   





* Basic metabolic panel (2018  5:36 AM CST)





    



              Component     Value        Ref Range     Performed At     Pathologist



                                         Signature

 

    



                 Sodium          142             135 - 148 mEq/L     [facility] 

 

    



                 Potassium       4.2             3.5 - 5.0 mEq/L     [facility] 

 

    



                 Chloride        105             98 - 112 mEq/L     [facility] 

 

    



                 CO2             30              24 - 31 mEq/L     [facility] 

 

    



                 Anion gap       7@ANIO          7 - 15 mEq/L     [facility] 

 

    



                 BUN             6               6 - 20 mg/dL     [facility] 

 

    



                 Creatinine      1.20            0.70 - 1.20 mg/dL     [facility] 

 

    



                 Glucose         113 (H)         65 - 99 mg/dL     [facility] 

 

    



                 Calcium         8.8             8.3 - 10.2 mg/dL     [facility] 













                                         Specimen

 





                                         Plasma specimen









   



                 Performing Organization     Address         City/[facility]/Zipcode     Phone Number

 

   



                     [facility]PART[facility]      [address] [place] [address] 



                                         PATHOLOGY AND GENOMIC   



                                         MEDICINE   

 

   



                     [facility]     [address]      [place] [id number] 



                                         [facility]   





* Gastrointestinal panel (12/10/2018 11:52 AM CST)





    



              Component     Value        Ref Range     Performed At     Pathologist



                                         Signature

 

    



                     Gastrointestina     Negative for all pathogens      [facility] panel             tested:             [facility] 



                           Negative for Salmonella      HOSPITAL 



                                         Negative for Campylobacter   



                                         Negative for Diarrheagenic E   



                                         coli/Shigella   



                                         Negative for Shiga-like   



                                         toxin-producing E coli   



                                         Negative for Plesiomonas   



                                         shigelloides   



                                         Negative for Yersinia   



                                         enterocolitica   



                                         Negative for Vibrio species   



                                         Negative for Clostridium   



                                         difficile (Toxin A/B)   



                                         Negative for Cryptosporidium   



                                         Negative for Giardia lamblia   



                                         Negative for Cyclospora   



                                         cayeteanensis   



                                         Negative for Entamoeba   



                                         histolytica   



                                         Negative for Adenovirus F   



                                         40/41   



                                         Negative for Astrovirus   



                                         Negative for Norovirus GI/GII   



                                         Negative for Rotavirus A   



                                         Negative for Sapovirus   



                                         Negative for Clostridium   



                                         difficile toxin   



                                         Negative for E coli 0157   



                                         This real-time PCR assay   



                                         detects the presence of   



                                         nucleic   



                                         acids (RNA or DNA) for the   



                                         gastrointestinal pathogens   



                                         listed.   



                                         A result of "Not-detected"   



                                         does not exclude the   



                                         possibility   



                                         of the presence of one or more   



                                         pathogens at concentrations   



                                         less than the detectable   



                                         limits of the assay.   



                                         Comment:   



                                         Specimen Information   



                                         Specimen Source: Stool   



                                         Specimen Site: Nonpreserved   













                                         Specimen

 





                                         Stool - Nonpreserved









   



                 Performing Organization     Address         City/[facility]/Zipcode     Phone Number

 

   



                     [facility] DEPARTMENT OF     [address] [place] [address] 



                                         PATHOLOGY AND [address]   





* Comprehensive metabolic panel (12/10/2018  5:02 AM CST)



Only the most recent of 3 results within the time period is included.





    



              Component     Value        Ref Range     Performed At     Pathologist



                                         Signature

 

    



                 Sodium          146             135 - 148 mEq/L     [facility] 

 

    



                 Potassium       4.7             3.5 - 5.0 mEq/L     [facility] 

 

    



                 Chloride        107             98 - 112 mEq/L     [facility] 

 

    



                 CO2             29              24 - 31 mEq/L     [facility] 

 

    



                 Anion gap       10@ANIO         7 - 15 mEq/L     [facility] 

 

    



                 BUN             9               6 - 20 mg/dL     [facility] 

 

    



                 Creatinine      1.20            0.70 - 1.20 mg/dL     [facility] 

 

    



                 Glucose         116 (H)         65 - 99 mg/dL     [facility] 

 

    



                 Calcium         9.0             8.3 - 10.2 mg/dL     [facility] 

 

    



                 Protein         6.4             6.3 - 8.3 g/dL     [place] 



                           Comment:                  [facility] 



                                          4.6-7.0 g/dL   



                                         1   



                                         week   



                                          4.4-7.6 g/dL   



                                         7   



                                         months-1year   



                                         5.1-7.3 g/dL   



                                         1-2   



                                         years5.6-7   



                                         .5 g/dL   



                                         >3   



                                         years6.0-8   



                                         .0 g/dL   



                                            



                                          6.3-8.3 g/dL   

 

    



                 Albumin         4.0             3.5 - 5.0 g/dL     [facility] 

 

    



                 A/G ratio       1.7             0.7 - 3.8       [facility] 

 

    



                 Alkaline        89              40 - 129 U/L     [place] 



                           phosphatase               [facility] 

 

    



                 AST             22              10 - 50 U/L     [facility] 

 

    



                 ALT             25              5 - 50 U/L      [facility] 

 

    



                 Total bilirubin     0.4             0.0 - 1.2 mg/dL     [facility] 













                                         Specimen

 





                                         Plasma specimen









   



                 Performing Organization     Address         City/State[facility]     Phone Number

 

   



                     [facility]     [id number] [place] [address]      [place] [address] 



                                         PATHOLOGY AND GENOMIC   



                                         MEDICINE   

 

   



                     [facility]     [id number] [place] [address]      [place] [address] 



                                         [facility]   





* CT Abdomen Pelvis W Contrast (2018 11:59 PM CST)









                                         Specimen

 











 



                           Narrative                 Performed At

 

 



                           EXAMINATION:CT ABDOMEN PELVIS W CONTRAST      [facility]



                                         CLINICAL HISTORY:Abd paindiverticulitis suspected 



                                         TECHNIQUE: Multiple axial images of the abdomen and pelvis were obtained 



                                         following intravenous administration of iodinated contrast. Sagittal and coronal

 



                                         computerized reformatted images were also obtained. 



                                         CT imaging was performed with iterative reconstruction technique and/or 



                                         automated exposure control to reduce radiation dose. 



                                         COMPARISON:2016 



                                         IMPRESSION: 



                                         Mild bibasilar atelectasis/scarring. Air cysts of the left lower lobe is seen 



                                         with small fluid/debris layering within, unchanged since 2016. 



                                         Liver, gallbladder, spleen, pancreas, adrenal glands are normal. 



                                         Kidneys, ureters are normal. Bladder is unremarkable. 



                                         No free intraperitoneal fluid or air. Atherosclerotic vascular calcifications 



                                         are seen. 



                                         Diverticulosis is seen. Air-fluid levels are seen throughout large bowel, with 





                                         mild wall thickening and adjacent fat stranding, compatible with mild infectious

 



                                         or inflammatory colitis. Appendix is normal. No gastrointestinal tract 



                                         obstruction. 



                                         Some prominent posterior mediastinal periaortic lymph nodes are unchanged since

 



                                         2016. 



                                         No acute osseous abnormalities. 



                                         CONCLUSION: 



                                         Findings are compatible with mild infectious or inflammatory colitis. 



                                         [facility]-[id number] 









                                        Procedure Note

 

                                        



Hm Interface, Radiology Results Incoming - 12/10/2018 12:13 AM CST



EXAMINATION:  CT ABDOMEN PELVIS W CONTRAST



CLINICAL HISTORY:  Abd pain  diverticulitis suspected



TECHNIQUE: Multiple axial images of the abdomen and pelvis were obtained 
following intravenous administration of iodinated contrast. Sagittal and coronal
computerized reformatted images were also obtained.



CT imaging was performed with iterative reconstruction technique and/or 
automated exposure control to reduce radiation dose.



COMPARISON:  2016





IMPRESSION:



Mild bibasilar atelectasis/scarring. Air cysts of the left lower lobe is seen 
with small fluid/debris layering within, unchanged since 2016.



Liver, gallbladder, spleen, pancreas, adrenal glands are normal.



Kidneys, ureters are normal. Bladder is unremarkable.



No free intraperitoneal fluid or air. Atherosclerotic vascular calcifications 
are seen.



Diverticulosis is seen. Air-fluid levels are seen throughout large bowel, with 
mild wall thickening and adjacent fat stranding, compatible with mild infectious
or inflammatory colitis. Appendix is normal. No gastrointestinal tract 
obstruction.



Some prominent posterior mediastinal periaortic lymph nodes are unchanged since 
2016.



No acute osseous abnormalities.



CONCLUSION:



Findings are compatible with mild infectious or inflammatory colitis.





[facility]-[id number]











   



                 Performing Organization     Address         City/State/Zipcode     Phone Number

 

   



                     [facility]          [address] [place] [address] 





* ECG 12 lead (2018 10:16 PM CST)





    



              Component     Value        Ref Range     Performed At     Pathologist



                                         Signature

 

    



                     Ventricular         105                 [facility] 



                                         rate    

 

    



                     Atrial rate         105                 [facility] 

 

    



                     CO interval         122                 [facility] 

 

    



                     QRSD interval       82                  [facility] 

 

    



                     QT interval         326                 [facility] 

 

    



                     QTC interval        430                 [facility] 

 

    



                     P axis 1            52                  [facility] 

 

    



                     QRS axis 1          49                  [facility] 

 

    



                     T wave axis         19                  [facility] 

 

    



                     EKG impression      Sinus tachycardia-Otherwise      [facility] 



                                         normal ECG-In automated   



                                         comparison with ECG of   



                                         2017 09:16,-No   



                                         significant change was   



                                         found-Electronically Signed By   



                                         Kristen Kate MD ([id number]) on   



                                         12/10/2018 10:48:59 AM   













                                         Specimen

 











 



                           Narrative                 Performed At

 

 



                                         This result has an attachment that is not available. 









   



                 Performing Organization     Address         City/State/Zipcode     Phone Number

 

   



                     [facility]            [address] [place] [address] 





* ECG ED Preliminary Interpretation - Not an Order (2018 10:14 PM CST)





 



                           Narrative                 Performed At

 

 



                                         Aleksey Tan MD 12/10/2018 12:56 AM 



                                         ECG ED Preliminary Interpretation - Not an Order 



                                         Performed by: Aleksey Tan MD 



                                         Authorized by: Aleksey Tan MD 



                                         ECG reviewed by ED Physician in the absence of a cardiologist: yes 



                                         Interpretation: 



                                         Interpretation: normal 



                                         Rate: 



                                         ECG rate:105 



                                         ECG rate assessment: tachycardic 



                                         Rhythm: 



                                         Rhythm: sinus tachycardia 



                                         Ectopy: 



                                         Ectopy: none 



                                         QRS: 



                                         QRS axis:Normal 



                                         Conduction: 



                                         Conduction: normal 



                                         ST segments: 



                                         ST segments:Non-specific 



                                         T waves: 



                                         T waves: normal 





* Urinalysis screen and microscopy, with reflex to culture (2018  9:46 PM 
  CST)





    



              Component     Value        Ref Range     Performed At     Pathologist



                                         Signature

 

    



                     Specimen site       Clean catch         [facility] 

 

    



                     Color, UA           Yellow              [facility] 

 

    



                     Appearance, UA      Clear               [facility] 

 

    



                 Specific        1.017           1.001 - 1.035     [place] 



                           gravity, [facility] 

 

    



                 pH, UA          6.0             5.0 - 8.5       [facility] 

 

    



                 Protein, UA     Negative        Negative        [facility] 

 

    



                 Glucose, UA     Negative        Negative        [facility] 

 

    



                 Ketones, UA     Negative        Negative        [facility] 

 

    



                 Bilirubin, UA     Negative        Negative        [facility] 

 

    



                 Blood, UA       Negative        Negative        [facility] 

 

    



                 Nitrite, UA     Negative        Negative        [facility] 

 

    



                 Urobilinogen,     Negative        <2.0            [facility] 

 

    



                 Leukocyte       Negative        Negative        [place] 



                           esterase, UA              [facility] 

 

    



                 Round           Few             0 - 1 /HPF      [place] 



                           epithelial                [facility] 



                           cells, [facility] 

 

    



                 WBC, UA         0-5             0 - 1 /HPF      [facility] 

 

    



                 RBC, UA         0-5             0 - 5 /HPF      [facility] 

 

    



                 Bacteria, UA     Trace           None seen       [facility] 

 

    



                     Yeast, UA           None seen           [facility] 

 

    



                     Yeast with          None seen           [place] 



                           pseudohyphae,             [facility] 

 

    



                 Hyaline casts,     3-5             /LPF            [facility] 













                                         Specimen

 





                                         Urine









   



                 Performing Organization     Address         City/[facility] Centercode     Phone Number

 

   



                     [address] [place] [address] 



                                         PATHOLOGY AND GENOMIC   



                                         MEDICINE   

 

   



                     [address]      [address]   





* Lipase level (2018  9:46 PM CST)





    



              Component     Value        Ref Range     Performed At     Pathologist



                                         Signature

 

    



                 Lipase          24              13 - 60 U/L     [facility] 













                                         Specimen

 





                                         Plasma specimen









   



                 Performing Organization     Address         City/[facility] Centercode     Phone Number

 

   



                     [address] [place] [address] 



                                         PATHOLOGY AND [facility]   



                                         MEDICINE   

 

   



                     [address]      [address]   





* Amylase level (2018  9:46 PM CST)





    



              Component     Value        Ref Range     Performed At     Pathologist



                                         Signature

 

    



                 Amylase         80 (H)          13 - 73 U/L     [facility] 













                                         Specimen

 





                                         Plasma specimen









   



                 Performing Organization     Address         City/[facility]     Phone Number

 

   



                     [address] [place] [address] 



                                         PATHOLOGY AND GENOMIC   



                                         MEDICINE   

 

   



                     [address]      [address]   





* URINALYSIS, COMPLETE, WITH REFLEX TO CULTURE (2018 11:12 AM CST)





    



              Component     Value        Ref Range     Performed At     Pathologist



                                         Signature

 

    



                 Color, UA       DARK YELLOW     YELLOW          [facility] 



                                         [facility] 



                                         [place] 

 

    



                 Appearance      CLEAR           CLEAR           [facility] 



                                         [place] 

 

    



                 Specific        1.021           1.001 - 1.035     [facility] 



                           gravity, urine            [facility] 



                                         [place] 

 

    



                 pH, urine       7.5             5.0 - 8.0       [facility] 



                                         [place] 

 

    



                 Glucose, urine     NEGATIVE        NEGATIVE        [facility] 



                                         [place] 

 

    



                 Bilirubin, UA     NEGATIVE        NEGATIVE        [facility] 



                                         [place] 

 

    



                 Ketones, UA     NEGATIVE        NEGATIVE        [facility] 



                                         [place] 

 

    



                 Occult blood,     NEGATIVE        NEGATIVE        [facility] 



                           urine                     [facility] 



                                         [place] 

 

    



                 Protein, UA     NEGATIVE        NEGATIVE        [facility] 



                                         [place] 

 

    



                 Nitrite, UA     NEGATIVE        NEGATIVE        [facility] 



                                         [place] 

 

    



                 Leukocyte       NEGATIVE        NEGATIVE        [facility] 



                           esterase, UA              [facility] 



                                         [place] 

 

    



                 WBC, UA         NONE SEEN       < OR=5 /HPF     [facility] 



                                         [place] 

 

    



                 RBC, UA         NONE SEEN       < OR=2 /HPF     [facility] 



                                         [place] 

 

    



                 Squamous        NONE SEEN       < OR=5 /HPF     [facility] 



                           epithelial                [facility] 



                           cells, UA                 [place] 

 

    



                 Bacteria, UA     NONE SEEN       NONE SEEN /HPF     [facility] 



                                         [facility] 



                                         [place] 

 

    



                 Hyaline casts,     NONE SEEN       NONE SEEN /LPF     [facility] 



                           UA                        [facility] 



                                         [place] 

 

    



                     Reflex              NO CULTURE INDICATED      [facility] 



                                         [place] 













                                         Specimen

 











 



                           Narrative                 Performed At

 

 



                           FASTING:YES               [facility]



                                         FASTING: YES 









                                        Resulting Agency Comment

 

                                        



Performing Organization Information:



Site ID: [id number]



Name: [facility]



Address: [address] [place] [id number]



Director: Asia Olivo









   



                 Performing Organization     Address         City/[facility] Centercode     Phone Number

 

   



                                         [facility]   

 

   



                 [employer] [place]     [address] [place] [address]     [phone]







* TSH reflex to T4 (2018 11:12 AM CST)





    



              Component     Value        Ref Range     Performed At     Pathologist



                                         Signature

 

    



                 TSH reflex to     0.71            0.40 - 4.50 mIU/L     [facility] 



                           FT4                       [facility] 



                                         [place] 













                                         Specimen

 





                                         Blood









 



                           Narrative                 Performed At

 

 



                           FASTING:YES               [facility]



                                         FASTING: YES 









                                        Resulting Agency Comment

 

                                        



Performing Organization Information:



Site ID: [id number]



Name: [facility]



Address: [address] [place] [id number]



Director: Asia Olivo









   



                 Performing Organization     Address         City/[facility] Centercode     Phone Number

 

   



                                         [facility]   

 

   



                 [employer] [place] [address]     [phone]







* Testosterone level, free and total, male (2018 11:12 AM CST)





    



              Component     Value        Ref Range     Performed At     Pathologist



                                         Signature

 

    



                 Testosterone,     449             250 - 1,100 ng/dL     [facility] 



                           total, lc/ms/ms           [facility] 



                                         [facility] 

 

    



                 Testosterone,     73.5            35.0 - 155.0 pg/mL     [facility] 



                     free                Comment:            [facility] 



                           **Data from J Clin Invest      [facility] 



                           1974:53:[id number] and J Clin      [facility] 



                                         Endocrinol   



                                         Metab 1973;36:[id number]. Men   



                                         with clinically significant   



                                         hypogonadal symptoms and   



                                         testosterone values repeatedly   



                                         in the   



                                         range of the 200-300 ng/dL or   



                                         less, may benefit from   



                                         testosterone   



                                         treatment after adequate risk   



                                         and benefits counseling.   



                                         For additional information,   



                                         please refer to   



                                         http://education.[employer]/faq/UXI001 (This link   



                                         is   



                                         being provided for   



                                         informational/ educational   



                                         purposes only.)   



                                         This test was developed and   



                                         its analytical performance   



                                         characteristics have been   



                                         determined by [facility]. It has not   



                                         been cleared or approved by   



                                         the [place]   



                                         Food and Drug Administration.   



                                         This assay has been validated   



                                         pursuant to the CLIA   



                                         regulations and is used for   



                                         clinical   



                                         purposes.   













                                         Specimen

 











 



                           Narrative                 Performed At

 

 



                           FASTING:YES               [facility]



                                         FASTING: YES 









                                        Resulting Agency Comment

 

                                        



Performing Organization Information:



Site ID: [id number]



Name: [facility]



Address: [address] [place] [address]



Director: Francis José M.D., Ph.D









   



                 Performing Organization     Address         City/[facility] Centercode     Phone Number

 

   



                                         [facility]     [address] [place] [address]     [phone]





                                         [place]   





* Prostate specific antigen (2018 11:12 AM CST)





    



              Component     Value        Ref Range     Performed At     Pathologist



                                         Signature

 

    



                 PSA             0.5             < OR=4.0 ng/mL     [facility] 



                           Comment:                  [facility] 



                           The total PSA value from this      [place] 



                                         assay system is   



                                         standardized against the WHO   



                                         standard. The test   



                                         result will be approximately   



                                         20% lower when compared   



                                         to the equimolar-standardized   



                                         total PSA (Eleuterio   



                                         [place]). Comparison of serial   



                                         PSA results should be   



                                         interpreted with this fact in   



                                         mind.   



                                         This test was performed using   



                                         the Siemens   



                                         chemiluminescent method.   



                                         Values obtained from   



                                         different assay methods cannot   



                                         be used   



                                         interchangeably. PSA levels,   



                                         regardless of   



                                         value, should not be   



                                         interpreted as absolute   



                                         evidence of the presence or   



                                         absence of disease.   













                                         Specimen

 











 



                           Narrative                 Performed At

 

 



                           FASTING:YES               [facility]



                                         FASTING: YES 









                                        Resulting Agency Comment

 

                                        



Performing Organization Information:



Site ID: [facility]



Name: [facility]



Address: [address] [place] [id number]



Director: Asia Olivo









   



                 Performing Organization     Address         City/[facility]     Phone Number

 

   



                                         [place] [address]     [phone]







* Creatine kinase, total (CPK) (2018 11:12 AM CST)





    



              Component     Value        Ref Range     Performed At     Pathologist



                                         [facility]

 

    



                 Creatine kinase     114             44 - 196 U/L     [facility] 













                                         Specimen

 











 



                           Narrative                 Performed At

 

 



                           FASTING:YES               [facility]



                                         FASTING: YES 









                                        Resulting Agency Comment

 

                                        



Performing Organization Information:



Site ID: [facility]



Name: [facility]



Address: [address] [place] [id number]



Director: Asia Olivo









   



                 Performing Organization     Address         City/[facility] Cent[facility]ode     Phone Number

 

   



                                         [place] [address]     [phone]







* Lipid panel (2018 11:12 AM CST)





    



              Component     Value        Ref Range     Performed At     Pathologist



                                         Signature

 

    



                 Cholesterol,     201 (H)         <200 mg/dL      [facility] 



                           total                     [facility] 

 

    



                 HDL cholesterol     56              >40 mg/dL       [facility] 

 

    



                 Triglycerides     129             <150 mg/dL      [facility] 

 

    



                 LDL cholesterol     121 (H)         mg/dL (calc)     [facility] 



                     calculated          Comment:            [facility] 



                           Reference range: <100      [place] 



                                         Desirable range <100 mg/dL for   



                                         primary prevention;   



                                         <70 mg/dL for patients with   



                                         CHD or diabetic patients   



                                         with > or=2 CHD risk factors.   



                                         LDL-C is now calculated using   



                                         the Carmen   



                                         calculation, which is a   



                                         validated novel method   



                                         providing   



                                         better accuracy than the   



                                         Friedewald equation in the   



                                         estimation of LDL-C.   



                                         Tyrone NARVAEZ et al. JARED.   



                                         2013;310(19): 5765-7727   



                                         (http://education.[employer]/faq/YHE391)   

 

    



                 Cholesterol/HDL     3.6             <5.0 (calc)     [facility] 



                           ratio                     [facility] 



                                         [place] 

 

    



                 Non-HDL         145 (H)         <130 mg/dL (calc)     [facility] 



                     cholesterol         Comment:            [facility] 



                           For patients with diabetes      [place] 



                                         plus 1 major ASCVD risk   



                                         factor, treating to a   



                                         non-HDL-C goal of <100 mg/dL   



                                         (LDL-C of <70 mg/dL) is   



                                         considered a therapeutic   



                                         option.   













                                         Specimen

 











 



                           Narrative                 Performed At

 

 



                           FASTING:YES               [facility]



                                         FASTING: YES 









                                        Resulting Agency Comment

 

                                        



Performing Organization Information:



Site ID: [facility]



Name: [facility]



Address: [address] [place] [id number]



Director: Asia Olivo









   



                 Performing Organization     Address         City/State/Zipcode     Phone Number

 

   



                                         [facility] [place]     [address] [place], [place] [id number] [phone]







after 2018



Insurance







                                        Type



            Payer      Benefit     Subscriber ID     Effective     Phone      Address 



                           Plan /                    Dates   



                                         Group     

 

                                        HMO



                 [employer]ETNA           [employer]           xxxxxxxxxx      2006-P   



                           HMO,POS,EP                resent   



                                         O, [employer]     









     



            Guarantor Name     Account     Relation to     Date of     Phone      Billing Address



                     Type                Patient             Birth  

 

     



            Pieter Thomas     Personal/F     Self       1965     [phone] [id number]



                     amily               (Home)              [place] [address]







Advance Directives





Patient has advance care planning documents, and code status on file. For more i
nformation, please contact:



Jose R Walker



[address]



[place] [address]









                          Date Inactivated          Comments



                           Code Status               Date Activated  

 

                          2018  4:59 PM        



                           Full Code                 12/10/2018  2:01 AM  









  



                           Code Status decision reached by:     Patient

## 2019-08-05 NOTE — XMS REPORT
Encounter Summary

                             Created on: 07/10/2019



Pieter Thomas

External Reference #: [id number]

: 1965

Sex: Male



Demographics







                          Address                   [address] 

[place]  [address]

 

                          Home Phone                [phone]

 

                          Preferred Language        English

 

                          Marital Status            

 

                          Pentecostal Affiliation     Unknown

 

                          Race                      White

 

                          Ethnic Group              Non-





Author







                          Organization              Unknown

 

                          Address                   [address] [place] [address]  [id number]



 

                          Phone                     [phone]







Care Team Providers







                    Care Team Member Name    Role                Phone

 

                    Dr. Edi Abdalla    3                   [phone]

 

                    Keyon King MD    [phone]                   [phone]

 

                    Matteo Alvarado MD    107                 [phone]

 

                    Ernesto Stubbs MD         113                 [phone]

 

                    Radha Velazquez MD    130                 [phone]



                                                      



Reason for Visit

                      





                                        Annual physical - male with PSA                



                                                                              



Instructions

          





                                        1. Adult health examination

 

                                         urinalysis, dipstick

 

                                         CBC w/ auto diff

 

                                         TSH, serum or plasma

 

                                        2. Essential hypertension

 

                                         olmesartan 20 mg tablet

 

                                         electrocardiogram

 

                                        3. Mixed hyperlipidemia

 

                                         atorvastatin 20 mg tablet

 

                                         lipid panel, serum

 

                                         CK (creatine kinase), total, serum

 

                                         CMP, serum or plasma

 

                                        4. Screening for malignant neoplasm of colon

 

                                        5. Screening for malignant neoplasm of prostate

 

                                         prostate cancer screening: care instructions

 

                                         PSA, serum or plasma

 

                                        6. Acute infective otitis externa

 

                                         Ciprodex 0.3 %-0.1 % ear drops,suspension

 

                                        7. Tear of meniscus of knee

 

                                         hydrocodone 5 mg-acetaminophen 325 mg tablet

 

                                        8. Primary erectile dysfunction

 

                                         testosterone, total, serum

 

                                         testosterone, free, serum

 

                                         tadalafil 20 mg tablet

 

                                        9. Generalized anxiety disorder

 

                                        10. Ulcerative colitis

 

                                        11. Chronic insomnia

 

                                        12. Body mass index 25-29 - overweight

 

                                         learning about healthy weight

 

                                        13. Serous otitis media

 

                                        14. Snoring

 

                                        15. Perennial allergic rhinitis







Discussion Note: None recorded.                                                 
                                                



Plan of Care

                      





                Reminders                                                                    Provider 

               

 

                Appointments    None recorded.                   

 

                Lab             Urinalysis, Dipstick    07/10/2019      [facility]

 

                               Lipid Panel, Serum    07/10/2019      [facility]

 

                               CK (Creatine Kinase), Total, Serum    07/10/2019      [facility]

 

                               CMP, Serum or Plasma    07/10/2019      [facility]

 

                               CBC W/ Auto Diff    07/10/2019      [facility]

 

                               TSH, Serum or Plasma    07/10/2019      [facility]

 

                               Testosterone, Total, Serum    07/10/2019      [facility]

 

                               Testosterone, Free, Serum    07/10/2019      [facility]

 

                               PSA, Serum or Plasma    07/10/2019      [facility]

 

                Referral        None recorded.                   

 

                Procedures      None recorded.                   

 

                Surgeries       None recorded.                   

 

                Imaging         Electrocardiogram    07/10/2019      [facility]



                                                                                
                                                                                
                



Medications

                      





                Name                      Start Date                                                

            

 

                                        alprazolam 0.5 mg tablet

 1 tablet once a day as needed          

 

                                        Apriso 0.375 gram capsule,extended release

 4 capsules once a day                  

 

                                        atorvastatin 20 mg tablet

 Take 1 tablet every day by oral route for 90 days.    

 

                                        Ciprodex 0.3 %-0.1 % ear drops,suspension

 INSTILL 4 DROPS INTO AFFECTED EAR(S) BY OTIC ROUTE 2 TIMES PER DAY FOR 7 DAYS    



 

                                        hydrocodone 5 mg-acetaminophen 325 mg tablet

 Take 1 tablet every 6 hours by oral route.    

 

                                        olmesartan 20 mg tablet

 Take 1 tablet every day by oral route for 30 days.    

 

                                        tadalafil 20 mg tablet

 Take 1 tablet every day by oral route as needed for 90 days.    

 

                                        zolpidem 10 mg tablet

 Take 1 tablet every day by oral route.    



                                                                                
                                                                             



Medications Administered

          



  None recorded.                                                                
               



Vitals

          





                Height          Weight          BMI             Blood Pressure 

 

                 6 ft 3 in        229.8 lbs        28.7 kg/m2        (1) 143/88 mm[Hg]

                                        (2) 150/95 mm[Hg]  



                                                                              



Lab Results

                      



              None recorded.                                                    
                                                



Allergies

          





          Code      Code System    Name      Reaction    Severity    Status    Onset

 

             NKDA                                             



                                                                              



Problems

                      





                Name                      Status                      Onset Date                      

Source                                                  

 

                Generalized Anxiety Disorder    Active          2019      

 

                Ulcerative Colitis    Active          2019      

 

                Chronic Insomnia    Active          2019      

 

                Cataract        Active          07/10/2019      

 

                Perennial Allergic Rhinitis    Active          07/10/2019      

 

                Snoring         Active          07/10/2019      



                                                                                
                                                          



Procedures

                      





                Date                      Name                      Performed by                      

                

 

                    2015          Orthopedic Surgery    Information not available

 

                    07/10/2019          Electrocardiogram    [facility]

                                        [address]

[place] [phone] ([facility])



                                                                                
                  



Vaccine List

          



None recorded.                                                                  
            



Social History

          





                    Smoking Status      Never Smoker         



                                                                              



Past Encounters

                      





                                        07/10/2019

Adult Health Examination; Essential Hypertension; Mixed Hyperlipidemia; 
Screening for Malignant Neoplasm of Colon; Screening for Malignant Neoplasm of 
Prostate; Acute Infective Otitis Externa; Tear of Meniscus of Knee; Primary 
Erectile Dysfunction; Generalized Anxiety Disorder; Ulcerative Colitis; Chronic 
Insomnia; Body Mass Index 25-29 - Overweight; Serous Otitis Media; Snoring; 
Perennial Allergic Rhinitis

Keyon King MD: [address], [place] [address], Ph. [phone]



                                                                                
        



History of Present Illness

          



Note:Here for Physical. Reports left ear pain after jumping into pool. Still 
with right knee pain. Has not been able to see Ortho since last appt.<div>
Started seeing Psych since March. No longer taking Lexapro and using Xanax only 
sporadically. No addnl Rx added.</div><div>No Hx HTN. At home  -155/70 -92
 for past 2 - 3 months. Relates to not being able to exercise.<br><div>
Colonoscopy - UTD - Dec 2018</div><div>FH DM and Dementia - Father; Breast CA - 
Mother</div></div>                                                              
      



Review of Systems

                      





                                                   Comprehensive General Adult ROS

 

                          Reported By:              Patient

 

                          Constitutional:           Constitutional: no fever, no night sweats, no significant weight

 loss, weight gain (15lbs)

 

                          Eyes:                     Eyes: no dry eyes, no irritation, vision change

 

                          ENMT:                     Ears: no difficulty hearing, ear pain; no tinnitus. Nose: no frequent nosebleeds,

 no nose problems, no sinus problems; better after sinuplasty. Mouth/Throat: no 
sore throat, no dry mouth, no mouth ulcers, no teeth problems, snoring

 

                          Cardiovascular:           Cardiovascular: no chest pain, no arm pain on exertion, no shortness

 of breath when walking, no shortness of breath when lying down, no 
palpitations, no lightheadedness; No PND

 

                          Respiratory:              Respiratory: no cough, no wheezing, no shortness of breath, no sleep

 apnea

 

                          Gastrointestinal:         Gastrointestinal: no abdominal pain, no nausea, no vomiting, 

no constipation, normal appetite, no dyspepsia, no GERD, frequent diarrhea; No 
recent BRBPR or melena

 

                          Genitourinary:            Genitourinary: no difficulty urinating, no hematuria, no increased

 frequency; no dysuria; no penile rashes or discharge, no testicular pain, 
swelling or masses, libido normal, erectile function decreased, Rx helps

 

                          Musculoskeletal:          Musculoskeletal: no muscle aches, no muscle weakness, no swelling

 in the extremities, arthralgias/joint pain, back pain; neck pain related to 
work at computer

 

                          Integumentary:            Skin: no abnormal mole, no jaundice, no rashes, no laceration; no

 dryness, no excess hair loss

 

                          Neurologic:               Neurologic: no weakness, no numbness, no dizziness, no headaches, no

 tremor

 

                          Psychiatric:              Psych: no depression, no anxiety, sleep disturbances; memory and concentration

 good

 

                          Endocrine:                Endocrine: no fatigue; no increased thirst, no increased hunger, no 

cold intolerance, no heat intolerance

 

                          Hematologic/Lymphatic:    Hematologic/Lymphatic no swollen glands, no bruising, no

 excessive bleeding

 

                          Allergic/Immunologic:     Allergy/Immunologic: no sinus pressure, runny nose, frequent

 sneezing; occ with allergies



                                                                              



Physical Exam

                      





                                                   General Adult Exam (male)

 

                          Reported By:              Patient

 

                          Constitutional:           General Appearance: healthy-appearing, overweight. Level of Distress:

 NAD

 

                          Psychiatric:              Insight: good judgement. Mental Status: active and alert, normal mood,

 normal affect. Orientation: to time, to place, to person. Memory: recent memory
 normal, remote memory normal

 

                          Head:                     Head: normocephalic, atraumatic

 

                          Eyes:                     Lids and Conjunctivae: non-injected, no discharge, no pallor. Pupils: PERRLA.

 Corneas: grossly intact. EOM: EOMI. Lens: clear. Sclerae: non-icteric. Vision: 
peripheral vision grossly intact, acuity grossly intact

 

                          ENMT:                     Ears: no lesions on external ear, EACs clear, TMs clear. Hearing: no hearing

 loss. Nose: nares patent, nasal passages clear, no sinus tenderness, no nasal 
discharge. Lips, Teeth, and Gums: no mouth or lip ulcers, no bleeding gums, 
normal dentition. Oropharynx: moist mucous membranes, no erythema, no exudates, 
tonsils not enlarged

 

                          Neck:                     Neck: supple, trachea midline, no masses, FROM. Lymph Nodes: no cervical 

LAD. Thyroid: no enlargement, non-tender, no nodules

 

                          Lungs:                    Respiratory effort: no dyspnea. Auscultation: breath sounds normal, no wheezing,

 no rales/crackles, no rhonchi

 

                          Cardiovascular:           Heart Auscultation: RRR, normal S1, normal S2, no murmurs. Neck

 vessels: no carotid bruits. Pulses including femoral / pedal: normal throughout

 

                          Abdomen:                  Bowel Sounds: normal. Inspection and Palpation: soft, no tenderness, no

 masses, no CVA tenderness. Liver: non-tender, no hepatomegaly. Spleen: non-
tender, no splenomegaly. Hernia: none palpable

 

                          Male :                  Penis: no lesions, no discharge, circumcised. Scrotum: no swelling, no

 tenderness. Testes: palpable bilaterally, not enlarged. Prostate: symmetrical, 
not enlarged, non-tender, smooth / no nodules

 

                          Rectal:                   Anus, Perineum, Rectum: normal tone, no hemorrhoids, no fissures, no masses



 

                          Musculoskeletal::         Motor Strength and Tone: normal, normal tone. Joints, Bones, 

and Muscles: no bony abnormalities; Right knee with decreased flexion, tender 
over joint lines. Extremities: no cyanosis, no edema, no varicosities

 

                          Neurologic:               Gait and Station: irregular gait. Cranial Nerves: grossly intact. Sensation:

 grossly intact. Reflexes: DTRs 2+ bilaterally throughout. Coordination and 
Cerebellum: no tremor

 

                          Skin:                     Inspection and palpation: no rash, no lesions, good turgor, no jaundice. 

Nails: normal

 

                          Back:                     Thoracolumbar Appearance: normal curvature

## 2019-08-05 NOTE — NUR
OPERATIVE NOTE ORTHOPEDICS

DATE OF SURGERY:  08/05/19

PREOPERATIVE DIAGNOSES: Right Knee Lateral Tibial Plateau Subchondral Bone 
Marrow Lesion & Medial Meniscus Root Tear

POSTOPERATIVE DIAGNOSES: Right Knee Lateral Tibial Plateau Subchondral Bone 
Marrow Lesion & Medial Meniscus Root Tear

PROCEDURE: 

1. Right Knee Arthroscopic Lateral Plateau Subchondroplasty

 2. Medial Meniscus Root Repair

3. Flouroscopic interpretation

SURGEON: Radha Velazquez DO

ANESTHESIA:  General

COMPLICATIONS: None

TOURNIQUET: Applied but not inflated. 

EBL: Minimal



INDICATIONS: Due to persistent pain and limitations on activity combined with 
findings on exam and imaging, the patient requests surgical treatment.  Nonopera
tive care and alternative surgical options were reviewed. We agreed that this 
provided the best risk/benefit profile for this patient, understanding and 
accepting risks of recurrent/persistent symptoms, infection, bleeding, 
stiffness, neurological/vascular damage, failure to improve and anesthetic 
complication (as reviewed by anesthesia service). Also, the patient 
understands that arthroscopic treatment of articular cartilage lesions 
provides temporary incomplete relief but that meniscal symptoms should be well 
addressed.



FINDINGS: Right Knee

Patella  Normal

Trochlea - Normal

Lateral Gutter  Normal

Medial Gutter  Normal

Medial Compartment

Femoral - Grade 1

Tibial - Grade 

Medial Meniscus - Posterior Horn horizontal cleavage tear with extension to an 
attenuated meniscal root

Cruciate region Normal

Lateral Compartment 

Femoral - Normal

Tibial - Normal with softening of the undersurface of the chonrdal region near 
the bone marrow lesion

Lateral Meniscus - Normal

Synovium - Normal



PROCEDURE:With the patient in the supine position with all prominences well 
padded, general anesthesia was obtained. Sterile prepping and draping were 
performed. Antibiotics had been given and a time out performed. After an injecti
on of 1% Lidocaine with Epinephrine in the proposed incision sites.

Under flouroscopic interpretation and the use of the patients MRI, a 
subchondroplasty cannula was placed into the lateral plateau with the 
fenestrations within the bone marrow lesion. 2.5 cc of subchondroplasty bone 
mimetic was introduced into the lesion without difficulty.  A blush was 
confirmed on flouroscopy demonstrating a good fill into the lesion.  

While the bone mimetic was curing, an arthroscope was inserted via a small 
lateral parapatellar tendon incision into the patellofemoral space. Under 
direct visualization, a medial parapatellar tendon portal was created 
providing a working portal.  Diagnostic arthroscopy was performed and the 
above findings were noted.

Within the medial compartment, the medial meniscus was debrided to establish a 
well-balanced rim with removal of the attenuated undersurface tear.  The 
superior edge of the meniscus was maintained.  The meniscal root was debrided. 
 With the of 0 novostitch using the [employer] meniscal system, two 0 novostitch steve
tures were placed along the lateral edge of the medial horn.  In a locking 
loop stitch, these sutures were passed through a 4.5 mm hole that was created 
through the use of a pointed guide.  A mccoy and nephew endobutton was placed 
at the anteromedial tibia and the suture limbs were tied over this.  
Arthroscopic determination demonstrated a well tensioned medial meniscus.  The 
meniscus appeared to have a superior tilt, so two fast fixes were used to 
assist with keeping the meniscus abutted against the tibial plateau.

Within the intercondylar space, the ACL was visualized and intact

The lateral compartment demonstrated a normal lateral meniscus with no 
chondromalacia.  There was no extravasation of the bone mimetic into the joint.

The joint was extravasated and the incisions were closed with vicryl and 
monocryl.Steristrips, Xeroform, 4x4s, ABDs and a compressive ACE bandage were 
applied.  The leg was placed in a brace. The patient was awakened and 
transferred to the PACU in satisfactory condition having tolerated the 
procedure well.

## 2019-08-05 NOTE — XMS REPORT
Encounter Summary

                             Created on: 2019



Pieter Thomas

External Reference #: [id number]

: 1965

Sex: Male



Demographics







                          Address                   [address]

[place]  [address]

 

                          Home Phone                [phone]

 

                          Preferred Language        English

 

                          Marital Status            

 

                          Islam Affiliation     Unknown

 

                          Race                      White

 

                          Ethnic Group              Non-





Author







                          Organization              Unknown

 

                          Address                   [address] [place] [address]  [address]



 

                          Phone                     [phone]







Care Team Providers







                    Care Team Member Name    Role                Phone

 

                    Dr. Edi Abdalla    3                   [phone]



                                                      



Reason for Visit

                      





                                        other - see typed reason                



                                                                              



Instructions

          





                                        1. Acute meniscal tear, lateral

 

                                         hydrocodone 5 mg-acetaminophen 325 mg tablet

 

                                         orthopedic referral

 

                                        2. Chronic insomnia

 

                                        3. Generalized anxiety disorder

 

                                         zolpidem 10 mg tablet

 

                                        4. Depression screening

 

                                         learning about depression

 

                                        5. Body mass index 25-29 - overweight

 

                                         learning about healthy weight

 

                                        6. Ulcerative colitis







Discussion Note: None recorded.                                                 
                                                



Plan of Care

                      





                Reminders                                                                    Provider 

               

 

                Appointments    None recorded.                   

 

                Lab             None recorded.                   

 

                Referral        Orthopedic Referral    2019      

 

                Procedures      None recorded.                   

 

                Surgeries       None recorded.                   

 

                Imaging         None recorded.                   



                                                                                
       



Medications

                      





                    Name                      Start Date                                      

 

                                        azelastine 137 mcg (0.1 %) nasal spray aerosol

 Spray 1 spray every day by nasal route as needed for 30 days.    

 

                                        escitalopram 10 mg tablet

 take 1 tablet po daily                 

 

                                        hydrocodone 5 mg-acetaminophen 325 mg tablet

 Take 1 tablet every 6 hours by oral route.    

 

                                        prednisone 10 mg tablet

 Take 1 tablet every day by oral route.    

 

                                        zolpidem 10 mg tablet

 Take 1 tablet every day by oral route.    



                                                                                
                                               



Medications Administered

          



  None recorded.                                                                
               



Vitals

          





                Height          Weight          BMI             Blood Pressure 

 

                 6 ft 3 in        222.6 lbs        27.8 kg/m2        (1) 142/92 mm[Hg]

                                        (2) 140/88 mm[Hg]  



                                                                              



Lab Results

                      



              None recorded.                                                    
                                                



Allergies

          





          Code      Code System    Name      Reaction    Severity    Status    Onset

 

             NKDA                                             



                                                                              



Problems

                      





                Name                      Status                      Onset Date                      

Source                                                  

 

                Generalized Anxiety Disorder    Active          2019      

 

                Ulcerative Colitis    Active          2019      

 

                Chronic Insomnia    Active          2019      



                                                                                
                            



Procedures

          



None recorded.                                                                  
  



Vaccine List

          



None recorded.                                                                  
            



Social History

          





                    Smoking Status      Never Smoker         



                                                                              



Past Encounters

                      





                                        2019

Acute Meniscal Tear, Lateral; Chronic Insomnia; Generalized Anxiety Disorder; 
Depression Screening; Body Mass Index 25-29 - Overweight; Ulcerative Colitis

Keyon King MD: [address] [facility], [address], [place] [address], 
Ph. [phone]



                                                                                
        



History of Present Illness

                      





                                                   Knee

 

                          Reported By:              Patient

 

                          HPI:                      Location: right, anterior, lateral; s/p meniscal repair for same area. Quality:

 aching, stabbing. Severity: moderate, pain level 6/10. Duration: 17 days. 
Context: bending; onset after doing body squat. Aggravating Factors: weight 
bearing. Associated Symptoms: no weakness, no numbness, no tingling, no redness,
 no warmth, no catching/locking, no instability, swelling, popping/clicking. 
Previous Surgery: surgical procedure:



            





                                                   Anxiety/Depression

 

                          Reported By:              Patient

 

                          Notes:                    Doing well w current Tx



                                                                              



Review of Systems

                      





                                                   Comprehensive General Adult ROS

 

                          Reported By:              Patient

 

                          Constitutional:           Constitutional: no significant weight gain, weight loss (10 lbs)



 

                          Respiratory:              Respiratory: no shortness of breath

 

                          Gastrointestinal:         Gastrointestinal: no abdominal pain, no nausea, no vomiting, 

normal appetite

 

                          Neurologic:               Neurologic: no weakness, no numbness, no dizziness, no headaches

 

                          Psychiatric:              Psych: no depression, no alcohol abuse, no anxiety, no suicidal thoughts,

 sleep disturbances

 

                          Endocrine:                Endocrine: no fatigue



                                                                              



Physical Exam

                      





                                                   General Adult Exam (male)

 

                          Reported By:              Patient

 

                          Constitutional:           Level of Distress: NAD

 

                          Psychiatric:              Mental Status: active and alert. Orientation: to time, to place, to

 person

 

                          Eyes:                     Pupils: PERRLA. EOM: EOMI

 

                          Lungs:                    Respiratory effort: no dyspnea. Auscultation: breath sounds normal

 

                          Cardiovascular:           Heart Auscultation: RRR, no murmurs. Pulses including femoral /

 pedal: normal throughout

 

                          Abdomen:                  Bowel Sounds: normal. Inspection and Palpation: soft, no tenderness, no

 masses. Liver: non-tender, no hepatomegaly. Spleen: non-tender, no splenomegaly

 

                          Musculoskeletal::         Motor Strength and Tone: normal. Joints, Bones, and Muscles: 

normal movement of all extremities, tenderness. Extremities: no edema

 

                          Neurologic:               Gait and Station: irregular gait. Cranial Nerves: grossly intact. Sensation:

 grossly intact. Reflexes: DTRs 2+ bilaterally throughout

## 2019-08-05 NOTE — XMS REPORT
Summary of Care: 18 - 18

                             Created on: 2042



LONI GLEASON

External Reference #: [id number]

: 1965

Sex: Male



Demographics







                          Address                   [address] [facility] [address] [address] [facility] [address] 

[place]  [address]

 

                          Home Phone                [phone]

 

                          Preferred Language        English

 

                          Marital Status            Unknown

 

                          Sikh Affiliation     Unknown

 

                          Race                      White

 

                                        Additional Race(s)  

 

                          Ethnic Group              Non-





Author







                          Author                    [facility]

 

                          Organization              [facility]

 

                          Address                   Unknown

 

                          Phone                     Unavailable







Encounter





HQ Encntr_zack(FIN) [id number] Date(s): 18 - 18

[facility] [address] [facility] [address] [place] [address]- [phone]

Discharge Disposition: Home or Self Care





Vital Signs







 



                           Most recent to            1



                                         oldest [Reference 



                                         Range]: 

 

 



                           Height                    190.5 cm



                                         (18 3:08 PM)

 

 



                           Temperature Oral          98 DegF



                           [96.4-99.1 DegF]          (18 3:08 PM)

 

 



                           Blood Pressure            110/73 mmHg



                           [/60-90 mmHg]       (18 3:08 PM)

 

 



                           Peripheral Pulse          96 bpm



                           Rate [ bpm]         (18 3:08 PM)

 

 



                           Weight                    95.909 kg



                                         (18 3:08 PM)

 

 



                           Body Mass Index           26.43 m2



                                         (18 3:08 PM)







Problem List







    



              Condition     Effective Dates     Status       Health Status     Informant

 

    



                           Cough(Confirmed)          Active  

 

    



                           Sore                      Active  



                                         throat(Confirmed)    







Allergies, Adverse Reactions, Alerts







   



                 Substance       Reaction        Severity        Status

 

   



                           NKDA                      Active







Medications





Astelin 137 mcg/inh nasal spray

2 spray, NASAL, BID, # 1 ea, 0 Refill(s), Pharmacy: [facility]/pharmacy #[id number]

Start Date: 18

Stop Date: 18

Status: Ordered



codeine-guaiFENesin 10 mg-100 mg/5 mL oral syrup

10 mL, PO, Bedtime, PRN for cough, X 7 day, # 100 mL, 0 Refill(s)

Start Date: 18

Stop Date: 18

Status: Completed



TamiFLU 75 mg oral capsule

75 mg, PO, Q12H, X 5 day, # 10 cap, 0 Refill(s)

Start Date: 18

Stop Date: 18

Status: Completed



Results





No data available for this section



Immunizations





Given and Recorded





   



                 Vaccine         Date            Status          Refusal Reason

 

   



                     influenza virus vaccine, inactivated     10/1/17             Recorded 







Procedures







    



              Procedure     Date         Related Diagnosis     Body Site     Status

 

    



                           Knee joint operation        Completed

 

    



                           Nose operation            Completed

 

    



                           Shoulder joint operations        Completed

 

    



                           Tonsillectomy             Completed







Social History







 



                           Social History Type       Response

 

 



                           Substance Abuse           Use: None.

 

 



                           Exercise                  Exercise frequency: Daily.

 

 



                           Employment/School         Status: Employed.  Work/School description: procurement.

 

 



                           Alcohol                   Current

 

 



                           Smoking Status            Never smoker; Exposure to Tobacco Smoke None; Cigarette Smoking

 Last 365



                                         Days No; Reg Smoking Cessation Counseling No



                                         entered on: 18







Assessment and Plan





No data available for this section

## 2019-08-06 ENCOUNTER — HOSPITAL ENCOUNTER (EMERGENCY)
Dept: HOSPITAL 88 - ER | Age: 54
Discharge: HOME | End: 2019-08-06
Payer: COMMERCIAL

## 2019-08-06 VITALS — WEIGHT: 230 LBS | HEIGHT: 75 IN | BODY MASS INDEX: 28.6 KG/M2

## 2019-08-06 VITALS — DIASTOLIC BLOOD PRESSURE: 79 MMHG | SYSTOLIC BLOOD PRESSURE: 132 MMHG

## 2019-08-06 DIAGNOSIS — Z98.890: ICD-10-CM

## 2019-08-06 DIAGNOSIS — M25.561: Primary | ICD-10-CM

## 2019-08-06 PROCEDURE — 96372 THER/PROPH/DIAG INJ SC/IM: CPT

## 2019-08-06 PROCEDURE — 99283 EMERGENCY DEPT VISIT LOW MDM: CPT

## 2019-08-06 PROCEDURE — 96374 THER/PROPH/DIAG INJ IV PUSH: CPT

## 2019-08-06 NOTE — XMS REPORT
Clinical Summary

                             Created on: 2019



Pieter Thomas

External Reference #: [id number]

: 1965

Sex: Male



Demographics







                          Address                   [address] 

[place]  [address]

 

                          Home Phone                [phone]

 

                          Preferred Language        English

 

                          Marital Status            

 

                          Jain Affiliation     [id number]

 

                          Race                      White

 

                          Ethnic Group              Non-





Author







                          Author                    [facility]

 

                          Organization              [facility]

 

                          Address                   Unknown

 

                          Phone                     Unavailable







Support







                Name            Relationship    Address         Phone

 

                    Urvashi Thomas        [employer]                [address]

[place]  [address]                      [phone]







Care Team Providers







                    Care Team Member Name    Role                Phone

 

                    Keyon King MD    PCP                 [phone]







Allergies

No Known Allergies



Medications







                          End Date                  Status



              Medication     Sig          Dispensed     Refills      Start  



                                         Date  

 

                                                    Active



                 APRISO 0.375 gram 24 hr     TAKE 4          3                 



                     capsule             CAPSULES BY         6  



                                         MOUTH EVERY     



                                         DAY     

 

                                                    Active



                 HYDROcodone-acetaminophen     TAKE 1 TABLET      0                 



                     (XODOL) 5-300 mg per     BY MOUTH            8  



                           tablet                    EVERY 4 TO 6     



                                         HOURS AS     



                                         NEEDED FOR     



                                         PAIN     

 

                                                    Active



              tadalafil (CIALIS) 20 mg     Take 1 tablet     27 tablet     2              



                     tablet              (20 mg total)       8  



                                         by mouth     



                                         daily as     



                                         needed for     



                                         erectile     



                                         dysfunction.     

 

                                                    Active



              cetirizine-pseudoepHEDrin     Take 1 tablet     60 tablet     11             



                     e (ZyrTEC-D) 5-120 mg per     by mouth 2          8  



                           12 hr tablet              (two) times a     



                                         day.     

 

                                                    Active



              atorvastatin (LIPITOR) 20     TAKE 1 TABLET     90 tablet     1              



                     MG tablet           BY MOUTH            8  



                                         EVERY DAY     

 

                                                    Active



              dextromethorphan-guaifene     Take 1 tablet     28 each      0              



                     sin (MUCINEX DM)      by mouth 2          8  



                           mg tablet extended        (two) times a     



                           release 12 hr             day as needed     



                                         (congestion     



                                         post nasal     



                                         drip).     

 

                          2019                Active



              clotrimazole-betamethason     Apply        30 g         0              



                     e (LOTRISONE) 1-0.05 %     topically 2         8  



                           cream                     (two) times a     



                                         day.     

 

                          2018                Discontinued



              fluticasone (FLONASE) 50     USE 2 SPRAYS     48 mL        1              



                     mcg/actuation nasal spray     EACH NOSTRIL        7  



                                         EVERYDAY     

 

                          2018                Discontinued



              atorvastatin (LIPITOR) 20     TAKE 1 TABLET     90 tablet     0            05/15/201  



                     MG tablet           BY MOUTH            8  



                                         EVERY DAY     

 

                          2018                Discontinued



              dextromethorphan-guaifene     Take 1 tablet     28 each      0              



                     sin (MUCINEX DM)      by mouth 2          8  



                           mg tablet extended        (two) times a     



                           release 12 hr             day as needed     



                                         (congestion     



                                         post nasal     



                                         drip).     

 

                          2018                Discontinued



              zolpidem (AMBIEN) 10 mg     TAKE 1 TABLET     30 tablet     0              



                     tablet              BY MOUTH            8  



                                         NIGHTLY AS     



                                         NEEDED FOR     



                                         SLEEP     

 

                          2018                Discontinued



              zolpidem (AMBIEN) 10 mg     TAKE 1 TABLET     30 tablet     0              



                     tablet              BY MOUTH            8  



                                         NIGHTLY AS     



                                         NEEDED FOR     



                                         SLEEP     

 

                          2018                Discontinued



                     UNABLE TO FIND      Zirtec D            0   

 

                          10/09/2018                Discontinued



              escitalopram (LEXAPRO) 10     Take 1 tablet     30 tablet     2              



                     MG tablet           (10 mg total)       8  



                                         by mouth     



                                         daily for 90     



                                         days.     

 

                          2018                Discontinued



              clonAZEPAM (KlonoPIN) 0.5     Take 1 tablet     40 tablet     0              



                     MG tablet           (0.5 mg             8  



                                         total) by     



                                         mouth 3     



                                         (three) times     



                                         a day as     



                                         needed for     



                                         anxiety     



                                         (insomnia)     



                                         for up to 30     



                                         days.     

 

                          2018                



              promethazine-codeine     Take 5 mL by     120 mL       0              



                     (PHENERGAN with CODEINE)     mouth every 6       8  



                           6.25-10 mg/5 mL syrup     (six) hours     



                                         as needed for     



                                         cough for up     



                                         to 10 days.     

 

                          10/09/2018                Discontinued



              dextromethorphan-guaifene     Take 1 tablet     28 each      0              



                     sin (MUCINEX DM)      by mouth 2          8  



                           mg tablet extended        (two) times a     



                           release 12 hr             day as needed     



                                         (congestion     



                                         post nasal     



                                         drip).     

 

                          2018                



              levoFLOXacin (LEVAQUIN)     Take 1 tablet     5 tablet     0              



                     750 MG tablet       (750 mg             8  



                                         total) by     



                                         mouth daily     



                                         for 5 days.     

 

                          2018                Discontinued



              atorvastatin (LIPITOR) 20     TAKE 1 TABLET     90 tablet     0            /201  



                     MG tablet           BY MOUTH            8  



                                         EVERY DAY     

 

                          2018                Discontinued



              clonAZEPAM (KlonoPIN) 0.5     TAKE 1 TABLET     40 tablet     0            201  



                     MG tablet           BY MOUTH 3          8  



                                         TIMES A DAY     



                                         AS NEEDED FOR     



                                         ANXIETY/INSOM     



                                         MAHENDRA     

 

                          10/20/2018                



              clonAZEPAM (KlonoPIN) 0.5     TAKE 1 TABLET     30 tablet     0              



                     MG tablet           BY MOUTH 3          8  



                                         TIMES A DAY     



                                         AS NEEDED FOR     



                                         ANXIETY/INSOM     



                                         MAHENDRA     

 

                          2018                Discontinued



                     amoxicillin (AMOXIL) 500       0                   10/08/201  



                           MG capsule                8  

 

                          10/14/2018                



              azithromycin (ZITHROMAX)     Take 2       6 tablet     0            10/09/201  



                     250 MG tablet       tablets the         8  



                                         first day,     



                                         then 1 tablet     



                                         daily for 4     



                                         days.     

 

                          2018                Discontinued



              dextromethorphan-guaifene     Take 1 tablet     28 each      0            10/09/201  



                     sin (MUCINEX DM)      by mouth 2          8  



                           mg tablet extended        (two) times a     



                           release 12 hr             day as needed     



                                         (congestion     



                                         post nasal     



                                         drip).     

 

                          10/19/2018                



              promethazine-codeine     Take 5 mL by     120 mL       0            10/09/201  



                     (PHENERGAN with CODEINE)     mouth every 6       8  



                           6.25-10 mg/5 mL syrup     (six) hours     



                                         as needed for     



                                         cough for up     



                                         to 10 days.     

 

                          2018                Discontinued



              escitalopram (LEXAPRO) 10     Take 1 tablet     30 tablet     2            10/09/201  



                     MG tablet           (10 mg total)       8  



                                         by mouth     



                                         daily for 90     



                                         days.     

 

                          2018                Discontinued



              escitalopram (LEXAPRO) 10     Take 1 tablet     90 tablet     0              



                     MG tablet           (10 mg total)       8  



                                         by mouth     



                                         daily for 90     



                                         days.     

 

                          2018                



              clonAZEPAM (KlonoPIN) 0.5     Take 1 tablet     30 tablet     1              



                     MG tablet           (0.5 mg             8  



                                         total) by     



                                         mouth 2 (two)     



                                         times a day     



                                         as needed for     



                                         anxiety     



                                         (insomnia)     



                                         for up to 30     



                                         days.     

 

                          2018                



              mupirocin (BACTROBAN) 2 %     Apply        30 g         0              



                     ointment            topically to        8  



                                         left nostril     



                                         twice a day     

 

                          2018                Discontinued



              mometasone (ELOCON) 0.1 %     Apply        30 g         0              



                     cream               topically to        8  



                                         left leg rash     



                                         once daily     

 

                          2018                



              ciprofloxacin (CIPRO) 500     Take 1 tablet     16 tablet     0              



                     MG tablet           (500 mg             8  



                                         total) by     



                                         mouth 2 (two)     



                                         times a day     



                                         for 8 days.     

 

                          2018                



              metroNIDAZOLE (FLAGYL)     Take 1 tablet     24 tablet     0              



                     500 MG tablet       (500 mg             8  



                                         total) by     



                                         mouth 3     



                                         (three) times     



                                         a day for 8     



                                         days.     

 

                          2018                



              acetaminophen-codeine     Take 1 tablet     5 tablet     0              



                     (TYLENOL WITH CODEINE #3)     by mouth            8  



                           300-30 mg per tablet      every 6 (six)     



                                         hours as     



                                         needed for     



                                         moderate pain     



                                         for up to 5     



                                         doses.     

 

                          2019                



              azithromycin (ZITHROMAX)     Take 2       6 tablet     0              



                     250 MG tablet       tablets the         8  



                                         first day,     



                                         then 1 tablet     



                                         daily for 4     



                                         days.     

 

                          2019                



              promethazine-codeine     Take 5 mL by     120 mL       0              



                     (PHENERGAN with CODEINE)     mouth every 6       8  



                           6.25-10 mg/5 mL syrup     (six) hours     



                                         as needed for     



                                         cough for up     



                                         to 10 days.     

 

                          2019                



              escitalopram (LEXAPRO) 10     Take 1 tablet     90 tablet     1              



                     MG tablet           (10 mg total)       8  



                                         by mouth     



                                         daily for 90     



                                         days.     

 

                          2019                



              zolpidem (AMBIEN) 10 mg     Take 1 tablet     30 tablet     1              



                     tablet              (10 mg total)       8  



                                         by mouth     



                                         nightly as     



                                         needed for     



                                         sleep for up     



                                         to 30 days.     









                                        Status



            Hospital, Clinic, or     Ordered Dose     Route      Frequency     Start      End Date 



                           Other Facility            Date  



                                         Administered Medication      

 

                                        Ended



            methylPREDNISolone     40 mg      IM         once       20 



                     acetate (DEPO-MEDROL)        18                  8 



                                         injection 40      



                                         mgIndications: Acute      



                                         frontal sinusitis,      



                                         recurrence not specified      

 

                                        Ended



            methylPREDNISolone     40 mg      IM         once       10/09/20     10/09/201 



                     acetate (DEPO-MEDROL)        18                  8 



                                         injection 40      



                                         mgIndications: Acute      



                                         bronchitis, unspecified      



                                         organism      







Active Problems







 



                           Problem                   Noted Date

 

 



                           Colitis                   12/10/2018

 

 



                           Left tennis elbow         05/15/2017

 

 



                           Right elbow pain          2017

 

 



                           Lateral epicondylitis of right elbow     2017

 

 



                           Lateral epicondylitis of both elbows     10/03/2016







Encounters







                          Care Team                 Description



                     Date                Type                Specialty  

 

                                        



Keyon King MD                



                     2019          Refill              Family Medicine  

 

                                        



Keyon King MD                



                     2019          Refill              Family Medicine  

 

                                        



Keyon King MD                



                     2019          Refill              Family Medicine  

 

                                        



Keyon King MD               Psychophysiological insomnia (Primary Dx); 

Tinea corporis; 

Acute secretory otitis media of both ears; 

Anemia due to other cause, not classified; 

Allergic rhinitis, unspecified seasonality, unspecified trigger; 

Panic attack as reaction to stress; 

Mixed hyperlipidemia; 

Diverticulitis of large intestine without perforation or abscess without 
bleeding; 

Ulcerative colitis without complications, unspecified location (HCC); 

Weight loss; 

BMI 27.0-27.9,adult



                     2018          Office Visit        Family Medicine  

 

                                        



Aleksey Tan MD Bhatt, Sandeep, MD                      Colitis (Primary Dx)



                     2018          [facility]  



                           -                         Encounter   



                                         2018    

 

                                        



Keyon King MD                



                     2018          Refill              Family Medicine  

 

                                        



Rama Luna MA                       



                     2018          Telephone           [facility] Keyon Moy MD               Elevated transaminase level (Primary Dx)



                     2018          Orders Only         Family Keyon Moy MD               Routine check-up (Primary Dx); 

Mixed hyperlipidemia; 

Panic attack as reaction to stress; 

Dermatitis; 

Cellulitis of nostril; 

Psychophysiological insomnia; 

BMI 27.0-27.9,adult; 

Elevated blood pressure reading without diagnosis of hypertension; 

Erectile dysfunction, unspecified erectile dysfunction type; 

Chronic sinusitis, unspecified location; 

Lateral epicondylitis of both elbows; 

Acute secretory otitis media of both ears; 

Pharyngitis, unspecified etiology; 

Nocturia; 

Drug-induced constipation; 

Weight gain, abnormal



                     2018          Office Visit        Family Rama Morocho MA                       



                     2018          Telephone           [facility] Keyon Moy MD                



                     10/15/2018          Refill              Family Keyon Moy MD               Acute bronchitis, unspecified organism (Primary Dx); 

Pharyngitis, unspecified etiology; 

Other fatigue; 

Panic attack as reaction to stress; 

Insomnia, unspecified type; 

Other specified anxiety disorders; 

Left ear pain



                     10/09/2018          Office Visit        Family Keyon Moy MD                



                     2018          Refill              Family Keyon Moy MD                



                     2018          Refill              [facility] Keyon Moy MD                



                     2018          Telephone           [facility] Keyon Moy MD                



                     2018          Refill              Family Medicine  

 

                                        



Keyon King MD                



                     2018          Telephone           Family Medicine  

 

                                        



Keyon King MD               Acute frontal sinusitis, recurrence not specified (Primary

 Dx); 

Acute secretory otitis media of both ears; 

Pharyngitis, unspecified etiology; 

Anxiety; 

Psychophysiological insomnia; 

Panic attacks; 

Other fatigue



                     2018          Office Visit        Family Medicine  

 

                                        



Keyon King MD                



                     2018          Refill              Family Medicine  



after 2018



Family History







   



                 Medical History     Relation        Name            Comments

 

   



                           Diabetes                  Brother  

 

   



                           Hypertension              Brother  

 

   



                     Diabetes            Father              Flo Thomas 

 

   



                     Hearing loss        Father              Flo Thomas 

 

   



                     Heart disease       Father              Flo Thomas 

 

   



                 Stroke          Father          Flo          2002



                                         [place] 

 

   



                 Cancer          Mother          4th Stage       2015



                                         Breast 



                                         Cancer 

 

   



                     Diabetes            Paternal            Lenin Thomas 



                                         Grandfather  

 

   



                 Stroke          Paternal        Lenin Thomas     1976



                                         Grandfather  

 

   



                 Miscarriages /     Paternal        Alyssa       late 1930's



                     Stillbirths         Grandmother         William 

 

   



                 Vision loss     Paternal        Alyssa       Legally blind



                           Grandmother               William 









   



                 Relation        Name            Status          Comments

 

   



                                         Brother   

 

   



                           Father                    Flo Thomas  

 

   



                           Mother                    4th Stage  



                                         Breast Cancer  

 

   



                           Paternal Grandfather      Phelpsdada Thomas  

 

   



                           Paternal Grandmother      Alyssa Thomas  







Social History







                                        Date



                 Tobacco Use     Types           Packs/Day       Years Used 

 

                                         



                                         Never Smoker    

 

    



                                         Smokeless Tobacco: Never   



                                         Used   









   



                 Alcohol Use     Drinks/Week     oz/Week         Comments

 

   



                     Yes                 2 Glasses of        Socially



                                         wine  



                                         2 Cans of  



                                         beer  









 



                           Sex Assigned at Birth     Date Recorded

 

 



                                         Not on file 









                                        Industry



                           Job Start Date            Occupation 

 

                                        Not on file



                           Not on file               Not on file 









                                        Travel End



                           Travel History            Travel Start 

 





                                         No recent travel history available.







Last Filed Vital Signs







                                        Time Taken



                           Vital Sign                Reading 

 

                                        2018  9:40 AM CST



                           Blood Pressure            136/88 

 

                                        2018  9:40 AM CST



                           Pulse                     98 

 

                                        2018  9:40 AM CST



                           Temperature               36.7 C (98.1 F) 

 

                                        2018 10:49 AM CST



                           Respiratory Rate          15 

 

                                        2018  9:40 AM CST



                           Oxygen Saturation         100% 

 

                                        -



                           Inhaled Oxygen            - 



                                         Concentration  

 

                                        2018  9:40 AM CST



                           Weight                    99.8 kg (220 lb) 

 

                                        2018  9:40 AM CST



                           Height                    190.5 cm (6' 3") 

 

                                        2018  9:40 AM CST



                           Body Mass Index           27.5 







Plan of Treatment







   



                 Health Maintenance     Due Date        Last Done       Comments

 

   



                           COLONOSCOPY SCREENING     2017  

 

   



                           SHINGLES VACCINES (#1)     2017  

 

   



                           INFLUENZA VACCINE         2019  







Procedures







                                        Comments



                 Procedure Name     Priority        Date/Time       Associated Diagnosis 

 

                                        



Results for this procedure are in the results section.



                     HC COMPLETE BLD COUNT     Routine             2018  



                           W/AUTO DIFF               5:19 AM CST  

 

                                        



Results for this procedure are in the results section.



                     ESTIMATED GFR       Routine             2018  



                                         5:36 AM CST  

 

                                        



Results for this procedure are in the results section.



                     BASIC METABOLIC PANEL     Routine             2018  



                                         5:36 AM CST  

 

                                        



Results for this procedure are in the results section.



                     HC COMPLETE BLD COUNT     Routine             2018  



                           W/AUTO DIFF               5:36 AM CST  

 

                                        



Results for this procedure are in the results section.



                     GASTROINTESTINAL PANEL     Routine             12/10/2018  



                                         11:52 AM CST  

 

                                        



Results for this procedure are in the results section.



                     ESTIMATED GFR       Routine             12/10/2018  



                                         5:02 AM CST  

 

                                        



Results for this procedure are in the results section.



                     COMPREHENSIVE METABOLIC     Routine             12/10/2018  



                           PANEL                     5:02 AM CST  

 

                                        



Results for this procedure are in the results section.



                     HC COMPLETE BLD COUNT     Routine             12/10/2018  



                           W/AUTO DIFF               5:02 AM CST  

 

                                        



Results for this procedure are in the results section.



                     CT ABDOMEN PELVIS W     STAT                2018  



                           CONTRAST                  11:59 PM CST  

 

                                        



Results for this procedure are in the results section.



                     ECG 12-LEAD         Routine             2018  



                                         10:16 PM CST  

 

                                        



Results for this procedure are in the results section.



                     ECG ED PRELIMINARY     Routine             2018  



                           INTERPRETATION            10:14 PM CST  

 

                                        



Results for this procedure are in the results section.



                     ESTIMATED GFR       STAT                2018  



                                         9:46 PM CST  

 

                                        



Results for this procedure are in the results section.



                     URINALYSIS SCREEN AND     STAT                2018  



                           MICROSCOPY, WITH REFLEX      9:46 PM CST  



                                         TO CULTURE    

 

                                        



Results for this procedure are in the results section.



                     LIPASE LEVEL        STAT                2018  



                                         9:46 PM CST  

 

                                        



Results for this procedure are in the results section.



                     AMYLASE LEVEL       STAT                2018  



                                         9:46 PM CST  

 

                                        



Results for this procedure are in the results section.



                     COMPREHENSIVE METABOLIC     STAT                2018  



                           PANEL                     9:46 PM CST  

 

                                        



Results for this procedure are in the results section.



                     HC COMPLETE BLD COUNT     STAT                2018  



                           W/AUTO DIFF               9:46 PM CST  

 

                                        



Results for this procedure are in the results section.



                 URINALYSIS, COMPLETE,     Routine         2018      Erectile dysfunction, 



                     WITH REFLEX TO CULTURE      11:12 AM CST        unspecified erectile 



                                         dysfunction type 

 

                                        



Results for this procedure are in the results section.



                 TESTOSTERONE LEVEL, FREE     Routine         2018      Erectile dysfunction, 



                     AND TOTAL, MALE      11:12 AM CST        unspecified erectile 



                                         dysfunction type 

 

                                        



Results for this procedure are in the results section.



                 TSH REFLEX TO T4F     Routine         2018      Erectile dysfunction, 



                           11:12 AM CST              unspecified erectile 



                                         dysfunction type 

 

                                        



Results for this procedure are in the results section.



                 PROSTATE SPECIFIC ANTIGEN     Routine         2018      Erectile dysfunction, 



                           11:12 AM CST              unspecified erectile 



                                         dysfunction type 

 

                                        



Results for this procedure are in the results section.



                 LIPID PANEL     Routine         2018      Mixed hyperlipidemia 



                                         11:12 AM CST  

 

                                        



Results for this procedure are in the results section.



                 COMPREHENSIVE METABOLIC     Routine         2018      Mixed hyperlipidemia 



                           PANEL                     11:12 AM CST  

 

                                        



Results for this procedure are in the results section.



                 CBC WITH PLATELET AND     Routine         2018      Routine check-up 



                           DIFFERENTIAL              11:12 AM CST  

 

                                        



Results for this procedure are in the results section.



                 CREATINE KINASE, TOTAL     Routine         2018      Mixed hyperlipidemia 



                           (CPK)                     11:12 AM CST  



after 2018



Results

* CBC with platelet and differential (2018  5:19 AM CST)



Only the most recent of 5 results within the time period is included.





    



              Component     Value        Ref Range     Performed At     Pathologist



                                         Signature

 

    



                 WBC             5.74            4.50 - 11.00 k/uL     [facility] 

 

    



                 RBC             4.34 (L)        4.40 - 6.00 m/uL     [facility] 

 

    



                 HGB             13.2 (L)        14.0 - 18.0 g/dL     [facility] 

 

    



                 HCT             39.8 (L)        41.0 - 51.0 %     [facility] 

 

    



                 MCV             91.7            82.0 - 100.0 fL     [facility] 

 

    



                 MCH             30.4            27.0 - 34.0 pg     [facility] 

 

    



                 MCHC            33.2            31.0 - 37.0 g/dL     [facility] 

 

    



                 RDW - SD        45.5            37.0 - 55.0 fL     [facility] 

 

    



                 MPV             9.1             8.8 - 13.2 fL     [facility] 

 

    



                 Platelet count     170             150 - 400 k/uL     [facility] 

 

    



                 Nucleated RBC     0.00            /100 WBC        [facility] 

 

    



                 Neutrophils     65.8            39.0 - 69.0 %     [facility] 

 

    



                 Lymphocytes     16.9 (L)        25.0 - 45.0 %     [facility] 

 

    



                 Monocytes       13.4 (H)        0.0 - 10.0 %     [facility] 

 

    



                 Eosinophils     3.1             0.0 - 5.0 %     [facility] 

 

    



                 Basophils       0.3             0.0 - 1.0 %     [facility] 













                                         Specimen

 





                                         Blood









   



                 Performing Organization     Address         [facility]     Phone Number

 

   



                     [address] [place] [address] 



                                         PATHOLOGY AND GENOMIC   



                                         MEDICINE   

 

   



                     [address]      [address]   





* Estimated GFR (2018  5:36 AM CST)



Only the most recent of 3 results within the time period is included.





    



              Component     Value        Ref Range     Performed At     Pathologist



                                         Signature

 

    



                 Estimated GFR     69              mL/min/1.73 m2     [facility] 



                           Comment:                  [facility] 



                                         rpretation   



                                         G1   



                                         >=90 Normal or high   



                                         G2   



                                         60-89Mildly decreased   



                                         O2t21-49   



                                         Mildly to moderately   



                                         decreased   



                                         U4t21-32   



                                         Moderately to severely   



                                         decreased   



                                         G4   



                                         15-29Severely   



                                         decreased   



                                         G5   



                                         <15Kidney failure   



                                         The eGFR was calculated using   



                                         the Chronic Kidney Disease   



                                         Epidemiology Collaboration   



                                         (CKD-EPI) equation.   



                                         Interpretation is based on   



                                         recommendations of the   



                                         National Kidney   



                                         Foundation-Kidney Disease   



                                         Outcomes Quality   



                                         Initiative (NKF-KDOQI)   



                                         published in 2014.   













                                         Specimen

 





                                         Plasma specimen









   



                 Performing Organization     Address         [facility]     Phone Number

 

   



                     [address] [place] [address] 



                                         PATHOLOGY AND [facility]   



                                         MEDICINE   

 

   



                     [address]      [address]   





* Basic metabolic panel (2018  5:36 AM CST)





    



              Component     Value        Ref Range     Performed At     Pathologist



                                         Signature

 

    



                 Sodium          142             135 - 148 mEq/L     [facility] 

 

    



                 Potassium       4.2             3.5 - 5.0 mEq/L     [facility] 

 

    



                 Chloride        105             98 - 112 mEq/L     [facility] 

 

    



                 CO2             30              24 - 31 mEq/L     [facility] 

 

    



                 Anion gap       7@ANIO          7 - 15 mEq/L     [facility] 

 

    



                 BUN             6               6 - 20 mg/dL     [facility] 

 

    



                 Creatinine      1.20            0.70 - 1.20 mg/dL     [facility] 

 

    



                 Glucose         113 (H)         65 - 99 mg/dL     [facility] 

 

    



                 Calcium         8.8             8.3 - 10.2 mg/dL     [facility] 













                                         Specimen

 





                                         Plasma specimen









   



                 Performing Organization     Address         City/[facility]/Zipcode     Phone Number

 

   



                     [facility]PART[facility]      [address] [place] [address] 



                                         PATHOLOGY AND GENOMIC   



                                         MEDICINE   

 

   



                     [facility]     [id number]      [place] [id number] 



                                         [facility]   





* Gastrointestinal panel (12/10/2018 11:52 AM CST)





    



              Component     Value        Ref Range     Performed At     Pathologist



                                         Signature

 

    



                     Gastrointestina     Negative for all pathogens      [facility] panel             tested:             [facility] 



                           Negative for Salmonella      HOSPITAL 



                                         Negative for Campylobacter   



                                         Negative for Diarrheagenic E   



                                         coli/Shigella   



                                         Negative for Shiga-like   



                                         toxin-producing E coli   



                                         Negative for Plesiomonas   



                                         shigelloides   



                                         Negative for Yersinia   



                                         enterocolitica   



                                         Negative for Vibrio species   



                                         Negative for Clostridium   



                                         difficile (Toxin A/B)   



                                         Negative for Cryptosporidium   



                                         Negative for Giardia lamblia   



                                         Negative for Cyclospora   



                                         cayeteanensis   



                                         Negative for Entamoeba   



                                         histolytica   



                                         Negative for Adenovirus F   



                                         40/41   



                                         Negative for Astrovirus   



                                         Negative for Norovirus GI/GII   



                                         Negative for Rotavirus A   



                                         Negative for Sapovirus   



                                         Negative for Clostridium   



                                         difficile toxin   



                                         Negative for E coli 0157   



                                         This real-time PCR assay   



                                         detects the presence of   



                                         nucleic   



                                         acids (RNA or DNA) for the   



                                         gastrointestinal pathogens   



                                         listed.   



                                         A result of "Not-detected"   



                                         does not exclude the   



                                         possibility   



                                         of the presence of one or more   



                                         pathogens at concentrations   



                                         less than the detectable   



                                         limits of the assay.   



                                         Comment:   



                                         Specimen Information   



                                         Specimen Source: Stool   



                                         Specimen Site: Nonpreserved   













                                         Specimen

 





                                         Stool - Nonpreserved









   



                 Performing Organization     Address         City/[facility]/Zipcode     Phone Number

 

   



                     [facility] DEPARTMENT OF     [address] [place] [address] 



                                         PATHOLOGY AND [address]   





* Comprehensive metabolic panel (12/10/2018  5:02 AM CST)



Only the most recent of 3 results within the time period is included.





    



              Component     Value        Ref Range     Performed At     Pathologist



                                         Signature

 

    



                 Sodium          146             135 - 148 mEq/L     [facility] 

 

    



                 Potassium       4.7             3.5 - 5.0 mEq/L     [facility] 

 

    



                 Chloride        107             98 - 112 mEq/L     [facility] 

 

    



                 CO2             29              24 - 31 mEq/L     [facility] 

 

    



                 Anion gap       10@ANIO         7 - 15 mEq/L     [facility] 

 

    



                 BUN             9               6 - 20 mg/dL     [facility] 

 

    



                 Creatinine      1.20            0.70 - 1.20 mg/dL     [facility] 

 

    



                 Glucose         116 (H)         65 - 99 mg/dL     [facility] 

 

    



                 Calcium         9.0             8.3 - 10.2 mg/dL     [facility] 

 

    



                 Protein         6.4             6.3 - 8.3 g/dL     [facility] 



                           Comment:                  [facility] 



                                          4.6-7.0 g/dL   



                                         1   



                                         week   



                                          4.4-7.6 g/dL   



                                         7   



                                         months-1year   



                                         5.1-7.3 g/dL   



                                         1-2   



                                         years5.6-7   



                                         .5 g/dL   



                                         >3   



                                         years6.0-8   



                                         .0 g/dL   



                                            



                                          6.3-8.3 g/dL   

 

    



                 Albumin         4.0             3.5 - 5.0 g/dL     [facility] 

 

    



                 A/G ratio       1.7             0.7 - 3.8       [facility] 

 

    



                 Alkaline        89              40 - 129 U/L     [place] 



                           phosphatase               [facility] 

 

    



                 AST             22              10 - 50 U/L     [facility] 

 

    



                 ALT             25              5 - 50 U/L      [facility] 

 

    



                 Total bilirubin     0.4             0.0 - 1.2 mg/dL     [facility] 













                                         Specimen

 





                                         Plasma specimen









   



                 Performing Organization     Address         City[facility]     Phone Number

 

   



                     [facility]     [id number] [place] [address]      [place] [address] 



                                         PATHOLOGY AND GENOMIC   



                                         MEDICINE   

 

   



                     [facility]     [id number] [place] [address]      [place] [address] 



                                         [facility]   





* CT Abdomen Pelvis W Contrast (2018 11:59 PM CST)









                                         Specimen

 











 



                           Narrative                 Performed At

 

 



                           EXAMINATION:CT ABDOMEN PELVIS W CONTRAST      [facility]



                                         CLINICAL HISTORY:Abd paindiverticulitis suspected 



                                         TECHNIQUE: Multiple axial images of the abdomen and pelvis were obtained 



                                         following intravenous administration of iodinated contrast. Sagittal and coronal

 



                                         computerized reformatted images were also obtained. 



                                         CT imaging was performed with iterative reconstruction technique and/or 



                                         automated exposure control to reduce radiation dose. 



                                         COMPARISON:2016 



                                         IMPRESSION: 



                                         Mild bibasilar atelectasis/scarring. Air cysts of the left lower lobe is seen 



                                         with small fluid/debris layering within, unchanged since 2016. 



                                         Liver, gallbladder, spleen, pancreas, adrenal glands are normal. 



                                         Kidneys, ureters are normal. Bladder is unremarkable. 



                                         No free intraperitoneal fluid or air. Atherosclerotic vascular calcifications 



                                         are seen. 



                                         Diverticulosis is seen. Air-fluid levels are seen throughout large bowel, with 





                                         mild wall thickening and adjacent fat stranding, compatible with mild infectious

 



                                         or inflammatory colitis. Appendix is normal. No gastrointestinal tract 



                                         obstruction. 



                                         Some prominent posterior mediastinal periaortic lymph nodes are unchanged since

 



                                         2016. 



                                         No acute osseous abnormalities. 



                                         CONCLUSION: 



                                         Findings are compatible with mild infectious or inflammatory colitis. 



                                         [facility]-[id number] 









                                        Procedure Note

 

                                        



Hm Interface, Radiology Results Incoming - 12/10/2018 12:13 AM CST



EXAMINATION:  CT ABDOMEN PELVIS W CONTRAST



CLINICAL HISTORY:  Abd pain  diverticulitis suspected



TECHNIQUE: Multiple axial images of the abdomen and pelvis were obtained 
following intravenous administration of iodinated contrast. Sagittal and coronal
computerized reformatted images were also obtained.



CT imaging was performed with iterative reconstruction technique and/or 
automated exposure control to reduce radiation dose.



COMPARISON:  2016





IMPRESSION:



Mild bibasilar atelectasis/scarring. Air cysts of the left lower lobe is seen 
with small fluid/debris layering within, unchanged since 2016.



Liver, gallbladder, spleen, pancreas, adrenal glands are normal.



Kidneys, ureters are normal. Bladder is unremarkable.



No free intraperitoneal fluid or air. Atherosclerotic vascular calcifications 
are seen.



Diverticulosis is seen. Air-fluid levels are seen throughout large bowel, with 
mild wall thickening and adjacent fat stranding, compatible with mild infectious
or inflammatory colitis. Appendix is normal. No gastrointestinal tract 
obstruction.



Some prominent posterior mediastinal periaortic lymph nodes are unchanged since 
2016.



No acute osseous abnormalities.



CONCLUSION:



Findings are compatible with mild infectious or inflammatory colitis.





[facility]-[id number]











   



                 Performing Organization     Address         City/State/Zipcode     Phone Number

 

   



                     [facility]          [address] [place] [address] 





* ECG 12 lead (2018 10:16 PM CST)





    



              Component     Value        Ref Range     Performed At     Pathologist



                                         Signature

 

    



                     Ventricular         105                 [facility] 



                                         rate    

 

    



                     Atrial rate         105                 [facility] 

 

    



                     ND interval         122                 [facility] 

 

    



                     QRSD interval       82                  [facility] 

 

    



                     QT interval         326                 [facility] 

 

    



                     QTC interval        430                 [facility] 

 

    



                     P axis 1            52                  [facility] 

 

    



                     QRS axis 1          49                  [facility] 

 

    



                     T wave axis         19                  [facility] 

 

    



                     EKG impression      Sinus tachycardia-Otherwise      [facility] 



                                         normal ECG-In automated   



                                         comparison with ECG of   



                                         2017 09:16,-No   



                                         significant change was   



                                         found-Electronically Signed By   



                                         Kristen Kate MD ([id number]) on   



                                         12/10/2018 10:48:59 AM   













                                         Specimen

 











 



                           Narrative                 Performed At

 

 



                                         This result has an attachment that is not available. 









   



                 Performing Organization     Address         City/State/Zipcode     Phone Number

 

   



                     [facility]            [address] [place] [address] 





* ECG ED Preliminary Interpretation - Not an Order (2018 10:14 PM CST)





 



                           Narrative                 Performed At

 

 



                                         Aleksey Tan MD 12/10/2018 12:56 AM 



                                         ECG ED Preliminary Interpretation - Not an Order 



                                         Performed by: Aleksey Tan MD 



                                         Authorized by: Aleksey Tan MD 



                                         ECG reviewed by ED Physician in the absence of a cardiologist: yes 



                                         Interpretation: 



                                         Interpretation: normal 



                                         Rate: 



                                         ECG rate:105 



                                         ECG rate assessment: tachycardic 



                                         Rhythm: 



                                         Rhythm: sinus tachycardia 



                                         Ectopy: 



                                         Ectopy: none 



                                         QRS: 



                                         QRS axis:Normal 



                                         Conduction: 



                                         Conduction: normal 



                                         ST segments: 



                                         ST segments:Non-specific 



                                         T waves: 



                                         T waves: normal 





* Urinalysis screen and microscopy, with reflex to culture (2018  9:46 PM 
  CST)





    



              Component     Value        Ref Range     Performed At     Pathologist



                                         Signature

 

    



                     Specimen site       Clean catch         [facility] 

 

    



                     Color, UA           Yellow              [facility] 

 

    



                     Appearance, UA      Clear               [facility] 

 

    



                 Specific        1.017           1.001 - 1.035     [place] 



                           gravity, [facility] 

 

    



                 pH, UA          6.0             5.0 - 8.5       [facility] 

 

    



                 Protein, UA     Negative        Negative        [facility] 

 

    



                 Glucose, UA     Negative        Negative        [facility] 

 

    



                 Ketones, UA     Negative        Negative        [facility] 

 

    



                 Bilirubin, UA     Negative        Negative        [facility] 

 

    



                 Blood, UA       Negative        Negative        [facility] 

 

    



                 Nitrite, UA     Negative        Negative        [facility] 

 

    



                 Urobilinogen,     Negative        <2.0            [facility] 

 

    



                 Leukocyte       Negative        Negative        [place] 



                           esterase, UA              [facility] 

 

    



                 Round           Few             0 - 1 /HPF      [place] 



                           epithelial                [facility] 



                           cells, [facility] 

 

    



                 WBC, UA         0-5             0 - 1 /HPF      [facility] 

 

    



                 RBC, UA         0-5             0 - 5 /HPF      [facility] 

 

    



                 Bacteria, UA     Trace           None seen       [facility] 

 

    



                     Yeast, UA           None seen           [facility] 

 

    



                     Yeast with          None seen           [place] 



                           pseudohyphae,             [facility] 

 

    



                 Hyaline casts,     3-5             /LPF            [facility] 













                                         Specimen

 





                                         Urine









   



                 Performing Organization     Address         City/[facility]     Phone Number

 

   



                     [address] [place] [address] 



                                         PATHOLOGY AND GENOMIC   



                                         MEDICINE   

 

   



                     [address]      [address]   





* Lipase level (2018  9:46 PM CST)





    



              Component     Value        Ref Range     Performed At     Pathologist



                                         Signature

 

    



                 Lipase          24              13 - 60 U/L     [facility] 













                                         Specimen

 





                                         Plasma specimen









   



                 Performing Organization     Address         City/[facility]     Phone Number

 

   



                     [address] [place] [address] 



                                         PATHOLOGY AND [facility]   



                                         MEDICINE   

 

   



                     [address]      [address]   





* Amylase level (2018  9:46 PM CST)





    



              Component     Value        Ref Range     Performed At     Pathologist



                                         Signature

 

    



                 Amylase         80 (H)          13 - 73 U/L     [facility] 













                                         Specimen

 





                                         Plasma specimen









   



                 Performing Organization     Address         City/[facility]     Phone Number

 

   



                     [address] [place] [address] 



                                         PATHOLOGY AND GENOMIC   



                                         MEDICINE   

 

   



                     [address]      [address]   





* URINALYSIS, COMPLETE, WITH REFLEX TO CULTURE (2018 11:12 AM CST)





    



              Component     Value        Ref Range     Performed At     Pathologist



                                         Signature

 

    



                 Color, UA       DARK YELLOW     YELLOW          [facility] 



                                         [facility] 



                                         [place] 

 

    



                 Appearance      CLEAR           CLEAR           [facility] 



                                         [place] 

 

    



                 Specific        1.021           1.001 - 1.035     [facility] 



                           gravity, urine            [facility] 



                                         [place] 

 

    



                 pH, urine       7.5             5.0 - 8.0       [facility] 



                                         [place] 

 

    



                 Glucose, urine     NEGATIVE        NEGATIVE        [facility] 



                                         [place] 

 

    



                 Bilirubin, UA     NEGATIVE        NEGATIVE        [facility] 



                                         [place] 

 

    



                 Ketones, UA     NEGATIVE        NEGATIVE        [facility] 



                                         [place] 

 

    



                 Occult blood,     NEGATIVE        NEGATIVE        [facility] 



                           urine                     [facility] 



                                         [place] 

 

    



                 Protein, UA     NEGATIVE        NEGATIVE        [facility] 



                                         [place] 

 

    



                 Nitrite, UA     NEGATIVE        NEGATIVE        [facility] 



                                         [place] 

 

    



                 Leukocyte       NEGATIVE        NEGATIVE        [facility] 



                           esterase, UA              [facility] 



                                         [place] 

 

    



                 WBC, UA         NONE SEEN       < OR=5 /HPF     [facility] 



                                         [place] 

 

    



                 RBC, UA         NONE SEEN       < OR=2 /HPF     [facility] 



                                         [place] 

 

    



                 Squamous        NONE SEEN       < OR=5 /HPF     [facility] 



                           epithelial                [facility] 



                           cells, UA                 [place] 

 

    



                 Bacteria, UA     NONE SEEN       NONE SEEN /HPF     [facility] 



                                         [place] 

 

    



                 Hyaline casts,     NONE SEEN       NONE SEEN /LPF     [facility] 



                           UA                        [facility] 



                                         [place] 

 

    



                     Reflex              NO CULTURE INDICATED      [facility] 



                                         [place] 













                                         Specimen

 











 



                           Narrative                 Performed At

 

 



                           FASTING:YES               [facility]



                                         FASTING: YES 









                                        Resulting Agency Comment

 

                                        



Performing Organization Information:



Site ID: [id number]



Name: [facility]



Address: [address] [place] [id number]



Director: Asia Olivo









   



                 Performing Organization     Address         City/[facility]     Phone Number

 

   



                                         [facility]   

 

   



                 [facility] [place]     [address] [place] [address]     [phone]







* TSH reflex to T4 (2018 11:12 AM CST)





    



              Component     Value        Ref Range     Performed At     Pathologist



                                         Signature

 

    



                 TSH reflex to     0.71            0.40 - 4.50 mIU/L     [facility] 



                           FT4                       [facility] 



                                         [place] 













                                         Specimen

 





                                         Blood









 



                           Narrative                 Performed At

 

 



                           FASTING:YES               [facility]



                                         FASTING: YES 









                                        Resulting Agency Comment

 

                                        



Performing Organization Information:



Site ID: [id number]



Name: [facility]



Address: [address] [place] [id number]



Director: Asia Olivo









   



                 Performing Organization     Address         City/[facility]     Phone Number

 

   



                                         [facility]   

 

   



                 [place] [address]     [phone]







* Testosterone level, free and total, male (2018 11:12 AM CST)





    



              Component     Value        Ref Range     Performed At     Pathologist



                                         Signature

 

    



                 Testosterone,     449             250 - 1,100 ng/dL     [facility] 



                           total, lc/ms/ms           [facility] 



                                         [facility] 

 

    



                 Testosterone,     73.5            35.0 - 155.0 pg/mL     [facility] 



                     free                Comment:            [facility] 



                           **Data from J Clin Invest      [facility] 



                           1974:[id number] and J Clin      [facility] 



                                         Endocrinol   



                                         Metab 1973;36:[id number]. Men   



                                         with clinically significant   



                                         hypogonadal symptoms and   



                                         testosterone values repeatedly   



                                         in the   



                                         range of the 200-300 ng/dL or   



                                         less, may benefit from   



                                         testosterone   



                                         treatment after adequate risk   



                                         and benefits counseling.   



                                         For additional information,   



                                         please refer to   



                                         http://education.WiFast.flatev/faq/YFO389 (This link   



                                         is   



                                         being provided for   



                                         informational/ educational   



                                         purposes only.)   



                                         This test was developed and   



                                         its analytical performance   



                                         characteristics have been   



                                         determined by [facility]. It has not   



                                         been cleared or approved by   



                                         the [place]   



                                         Food and Drug Administration.   



                                         This assay has been validated   



                                         pursuant to the CLIA   



                                         regulations and is used for   



                                         clinical   



                                         purposes.   













                                         Specimen

 











 



                           Narrative                 Performed At

 

 



                           FASTING:YES               [facility]



                                         FASTING: YES 









                                        Resulting Agency Comment

 

                                        



Performing Organization Information:



Site ID: [id number]



Name: [facility]



Address: [address] [place] [address]



Director: Francis José M.D., Ph.D









   



                 Performing Organization     Address         City/[facility]     Phone Number

 

   



                                         [facility]     [address] [place] [address]     [phone]





                                         [place]   





* Prostate specific antigen (2018 11:12 AM CST)





    



              Component     Value        Ref Range     Performed At     Pathologist



                                         Signature

 

    



                 PSA             0.5             < OR=4.0 ng/mL     [facility] 



                           Comment:                  [facility] 



                           The total PSA value from this      [facility] 



                                         assay system is   



                                         standardized against the WHO   



                                         standard. The test   



                                         result will be approximately   



                                         20% lower when compared   



                                         to the equimolar-standardized   



                                         total PSA (Eleuterio   



                                         [place]). Comparison of serial   



                                         PSA results should be   



                                         interpreted with this fact in   



                                         mind.   



                                         This test was performed using   



                                         the Siemens   



                                         chemiluminescent method.   



                                         Values obtained from   



                                         different assay methods cannot   



                                         be used   



                                         interchangeably. PSA levels,   



                                         regardless of   



                                         value, should not be   



                                         interpreted as absolute   



                                         evidence of the presence or   



                                         absence of disease.   













                                         Specimen

 











 



                           Narrative                 Performed At

 

 



                           FASTING:YES               [facility]



                                         FASTING: YES 









                                        Resulting Agency Comment

 

                                        



Performing Organization Information:



Site ID: [id number]



Name: [facility]



Address: [address] [place] [id number]



Director: Asia Olivo









   



                 Performing Organization     Address         [facility]     Phone Number

 

   



                                         [place] [address]     [phone]







* Creatine kinase, total (CPK) (2018 11:12 AM CST)





    



              Component     Value        Ref Range     Performed At     Pathologist



                                         [facility]

 

    



                 Creatine kinase     114             44 - 196 U/L     [facility] 













                                         Specimen

 











 



                           Narrative                 Performed At

 

 



                           FASTING:YES               [facility]



                                         FASTING: YES 









                                        Resulting Agency Comment

 

                                        



Performing Organization Information:



Site ID: [id number]



Name: [facility]



Address: [address] [place] [id number]



Director: Asia Olivo









   



                 Performing Organization     Address         [facility]     Phone Number

 

   



                                         [place] [address]     [phone]







* Lipid panel (2018 11:12 AM CST)





    



              Component     Value        Ref Range     Performed At     Pathologist



                                         Signature

 

    



                 Cholesterol,     201 (H)         <200 mg/dL      [facility] 



                           total                     [facility] 

 

    



                 HDL cholesterol     56              >40 mg/dL       [facility] 

 

    



                 Triglycerides     129             <150 mg/dL      [facility] 

 

    



                 LDL cholesterol     121 (H)         mg/dL (calc)     [facility] 



                     calculated          Comment:            [facility] 



                           Reference range: <100      [facility] 



                                         Desirable range <100 mg/dL for   



                                         primary prevention;   



                                         <70 mg/dL for patients with   



                                         CHD or diabetic patients   



                                         with > or=2 CHD risk factors.   



                                         LDL-C is now calculated using   



                                         the Carmen   



                                         calculation, which is a   



                                         validated novel method   



                                         providing   



                                         better accuracy than the   



                                         Friedewald equation in the   



                                         estimation of LDL-C.   



                                         Tyrone NARVAEZ et al. JARED.   



                                         2013;310(19): 2215-6101   



                                         (http://education.[employer].flatev/faq/ANO057)   

 

    



                 Cholesterol/HDL     3.6             <5.0 (calc)     [facility] 



                           ratio                     [facility] 



                                         [facility] 

 

    



                 Non-HDL         145 (H)         <130 mg/dL (calc)     [facility] 



                     cholesterol         Comment:            [facility] 



                           For patients with diabetes      [facility] 



                                         plus 1 major ASCVD risk   



                                         factor, treating to a   



                                         non-HDL-C goal of <100 mg/dL   



                                         (LDL-C of <70 mg/dL) is   



                                         considered a therapeutic   



                                         option.   













                                         Specimen

 











 



                           Narrative                 Performed At

 

 



                           FASTING:YES               [facility]



                                         FASTING: YES 









                                        Resulting Agency Comment

 

                                        



Performing Organization Information:



Site ID: [id number]



Name: [facility]



Address: [address] [place] [phone]



Director: Asia Olivo









   



                 Performing Organization     Address         City/State/Zipcode     Phone Number

 

   



                                         [facility]     [address] [place], [place] [id number] [phone]







after 2018



Insurance







                                        Type



            Payer      Benefit     Subscriber ID     Effective     Phone      Address 



                           Plan /                    Dates   



                                         Group     

 

                                        HMO



                 [employer]ETNA           [employer]           xxxxxxxxxx      2006-P   



                           HMO,POS,EP                resent   



                                         O, [employer]     









     



            Guarantor Name     Account     Relation to     Date of     Phone      Billing Address



                     Type                Patient             Birth  

 

     



            Pieter Thomas     Personal/F     Self       1965     [phone] [id number]



                     amily               (Home)              [place] [address]







Advance Directives





Patient has advance care planning documents, and code status on file. For more i
nformation, please contact:



Jose R Walker



[address]



[place] [address]









                          Date Inactivated          Comments



                           Code Status               Date Activated  

 

                          2018  4:59 PM        



                           Full Code                 12/10/2018  2:01 AM  









  



                           Code Status decision reached by:     Patient

## 2019-08-06 NOTE — XMS REPORT
Continuity of Care Document

                             Created on: 2019



LONI GLEASON

External Reference #: [id number]

: 1965

Sex: Male



Demographics







                          Address                   [address] [place] [address] 

[place]  [address]

 

                          Home Phone                [phone]

 

                          Preferred Language        English

 

                          Marital Status            Unknown

 

                          Orthodox Affiliation     Unknown

 

                          Race                      Unknown

 

                          Ethnic Group              Unknown





Author







                          Author                    [employer]

 

                          Address                   Unknown

 

                          Phone                     Unavailable







Care Team Providers







                    Care Team Member Name    Role                Phone

 

                    [employer]    Unavailable         Unavailable



                                    



Problems

                    





                    Problem                            Status                            Onset Date     

                          Classification                            Date Reported       

                          Comments                            Source                    

 

                    Ulcerative colitis                                                        2019

                            Diagnosis                            2019          

                                                      [facility]                

    

 

                          Body mass index 25-29 - overweight                                                

                    2019                            Diagnosis                            2019

                                                        [facility]      

              

 

                    Depression screening                                                        2019

                            Diagnosis                            2019          

                                                      [facility]                

    

 

                    Acute meniscal tear, lateral                                                        

2019                            Diagnosis                            2019

                                                        [facility]      

              

 

                    Generalized anxiety disorder                                                        

2019                            Diagnosis                            2019

                                                        [facility]      

              

 

                    Chronic insomnia                                                        2019  

                          Diagnosis                            2019            

                                                      [facility]                  

  

 

                    Generalized Anxiety Disorder                                                        

2019                            Problem                            2019  

                                                      [facility]        

            

 

                    Ulcerative Colitis                                                        2019

                            Problem                            2019            

                                                      [facility]                  

  

 

                    Chronic Insomnia                                                        2019  

                          Problem                            2019              

                                                      [facility]                    



 

                    097 - OTH   UNSPEC SY                            Active                            2015

                                                                                       

                                         [facility] [place]                    

 

                    724.2 - LUMBAGO                            Active                            2015

                                                                                       

                                         [facility] [place]                    

 

                    Cough                            Active                                             

                    Problem                            2019                               

                                         [facility]                    

 

                    Sore throat                            Active                                       

                          Problem                            2019                       

                                                       [facility]                    

 

                          Elevated blood pressure reading                            Active                 

                                                Problem                            2019   

                                                       [facility]                

    

 

                    PND (Confirmed)                            Active                                   

                          Problem                            2019                   

                                                       [facility]                    

 

                    Sinusitis                            Active                                         

                          Problem                            2019                         

                                                       [facility]                    



                                                                                
                                                                                
                                                                                
                                                                     



Medications

                    





                    Medication                            Details                            Route      

                          Status                            Patient Instructions         

                          Ordering Provider                            Order Date           

                                        Source                    

 

                          benzonatate 200 MG Oral Capsule [Tessalon]                            200 mg=1 cap,

 PO, TID, X 10 day, # 30 cap, 0 Refill(s), Pharmacy: [facility]/pharmacy #[id number]         
                                                Active                                  

                                                      2019                         

                                         [facility]                    

 

                          Azithromycin 5 Day Dose Pack 250 mg oral tablet                            See Instructions,

 Take 2 tablets by mouth the first day then 1 tablet by mouth days 2-5., X 5 
day, # 6 tab, 0 Refill(s), Pharmacy: [facility]/pharmacy #[id number]                            

                            Active                                                   

                                                2019                            [facility]

                    

 

                                        Azelastine hydrochloride 0.137 MG/ACTUAT Metered Dose Nasal Spray [Astelin]     

                                        2 spray, NASAL, BID, # 1 ea, 0 Refill(s), Pharmacy: [facility]/pharmacy

 #[id number]                                                        Active                 

                                                                            2018          

                                        [facility]                    

 

                                        Codeine Phosphate 2 MG/ML / Guaifenesin 20 MG/ML Oral Solution                  

                                        10 mL, PO, Bedtime, PRN for cough, X 7 day, # 100 mL, 0 Refill(s)        

                                                      No Longer Active                     

                                                                            2018              

                                        [facility]                    

 

                          Oseltamivir 75 MG Oral Capsule [Tamiflu]                            75 mg, PO, Q12H,

 X 5 day, # 10 cap, 0 Refill(s)                                                    

                    No Longer Active                                                                   

                          2018                            [facility]              

      

 

                                        Azelastine hydrochloride 0.137 MG/ACTUAT Metered Dose Nasal Spray               

                                        azelastine 137 mcg (0.1 %) nasal spray aerosol Spray 1 spray every day

 by nasal route as needed for 30 days.                                             

                    Active                                                                      

                                                      [facility]                    



 

                          Escitalopram 10 MG Oral Tablet                            escitalopram 10 mg tablet

 take 1 tablet po daily                                                        Active

                                                                                       

                                        [facility]                    

 

                                        Acetaminophen 325 MG / Hydrocodone Bitartrate 5 MG Oral Tablet                  

                                        hydrocodone 5 mg-acetaminophen 325 mg tablet Take 1 tablet every 6 hours 

by oral route.                                                        Active         

                                                                                        

                                        [facility]                    

 

                          Prednisone 10 MG Oral Tablet                            prednisone 10 mg tablet Take

 1 tablet every day by oral route.                                                 

                    Active                                                                          

                                                      [facility]                    

 

                          Zolpidem tartrate 10 MG Oral Tablet                            zolpidem 10 mg tablet

 Take 1 tablet every day by oral route.                                            

                    [facility]                   

 



                                                                                
                                                                                
                                                                    



Allergies, Adverse Reactions, Alerts

                    





                    Substance                            Category                            Reaction   

                          Severity                            Reaction type           

                          Status                            Date Reported                     

                          Comments                            Source                    

 

                          No Known Medication Allergies                            Assertion                

                                                                            Drug allergy       

                                                                                       

                                        [facility]                    



                                                        



Immunizations

                    





                    Immunization                            Date Given                            Site  

                          Status                            Last Updated             

                          Comments                            Source                    

 

                          influenza virus vaccine, inactivated                            10/08/2018        

                                                completed                            [facility]             

       

 

                          influenza virus vaccine, inactivated                            10/01/2017        

                                                completed                            [facility]             

       



                                                             



Results







                                        No Data Provided for This Section



                    



Pathology Reports







                                        No Data Provided for This Section                    



                                                



Diagnostic Reports

                    





                    Report                            Value                            Date             

                                        Source                    

 

                          Spine lumbar wo contrast MRI                            MRI LUMBAR SPINE WITHOUT CONTRAST

 

 

COMPARISON: 2015 radiograph exam.

 

TECHNIQUE: Sagittal T1, sagittal T2 with fat saturation, axial T1 and axial T2 
images were obtained. No intravenous gadolinium was given.

 

FINDINGS: 

 

The paravertebral soft tissues are normal. 

 

The conus medullaris terminates at the L1 level. Multilevel thoracolumbar spine 
endplate Schmorl's nodes are identified, without marrow edema.

 

T12-L1: Unremarkable.

 

L1-L2: Unremarkable.

 

L2-L3: No central canal stenosis. There is mild bilateral foraminal and 
extraforaminal disc bulge with mild bilateral foraminal stenosis. No exiting 
nerve root impingement is present.

 

L3-L4: No central canal stenosis. Mild bilateral foraminal stenosis due to disc 
bulge encroachment is present. No significant mass effect on the exiting nerve 
roots.

 

L4-L5: Mild disc bulge is present. No central canal stenosis is present. Mild 
bilateral foraminal stenosis is present with disc bulge contacting the bilateral
 L4 exiting nerve roots.

 

L5-S1: Minimal disc bulge is seen without thecal sac stenosis. No foraminal 
stenosis.

 

 

 

IMPRESSION:

                                        1. Several levels of mild foraminal stenosis without significant mass effect on 

the exiting nerve roots. No focal disc herniation or significant central canal 
stenosis.

 

                            2015                             [facility] [place]

                    

 

                          Spine lumbar 2 or 3 views DX                            EXAMINATION: Lumbar spine 

- 2 to 3 views

 

HISTORY: Lumbago.

 

FINDINGS: Frontal, lateral, and coned lateral views of the lumbar spine are 
performed without comparison.

 

The alignment is normal without listhesis. The intervertebral disc spaces are 
normal. The vertebral body heights are normal without compression fracture. The 
sacral ala appear intact.

 

IMPRESSION:

 

                                        1. Normal alignment and intervertebral disc spaces of the lumbar spine.

                            2015                             [facility] [place] 

                   



                                                                                
    



Consultation Notes

                    





                                        No Data Provided for This Section                    



                                                            



Discharge Summaries

                    





                                        No Data Provided for This Section                    



                                                            



History and Physicals

                    





                                        No Data Provided for This Section                    



                                                                



Vital Signs

                     





                    Vital Sign                            Value                            Date         

                          Comments                            Source                    

 

                    Weight                            106.455                             2019    

                                                       [facility]                 

   

 

                    Height                            190.5 cm                            2019    

                                                       [facility]                 

   

 

                    Heart Rate                            86                             2019     

                                                       [facility]                  

  

 

                    Respitory Rate                            16                             2019 

                                                        [facility]              

      

 

                    Temperature Oral (F)                            97.9 F                            2019

                                                         [facility]             

       

 

                    Systolic (mm Hg)                            151                             2019

                                                         [facility]             

       

 

                    Diastolic (mm Hg)                            94                             2019

                                                         [facility]             

       

 

                    BMI Calculated                            29.33                             2019

                                                         [facility]             

       

 

                    Diastolic (mm Hg)                            88                             2019

                                                        [facility]      

              

 

                    Height                            75                             2019         

                                                      [facility]               

     

 

                    Systolic (mm Hg)                            140                             2019

                                                        [facility]      

              

 

                    Weight                            222.6                             2019      

                                                      [facility]            

        

 

                    Systolic (mm Hg)                            110                             2018

                                                         [facility]             

       

 

                    Diastolic (mm Hg)                            73                             2018

                                                         [facility]             

       

 

                    Heart Rate                            96                             2018     

                                                       [facility]                  

  

 

                    Temperature Oral (F)                            98 F                            2018

                                                         [facility]             

       

 

                    BMI Calculated                            26.43                             2018

                                                         [facility]             

       

 

                    Weight                            95.909                             2018     

                                                       [facility]                  

  

 

                    Height                            190.5 cm                            2018    

                                                       [facility]                 

   



                                                                                
                                                                                
                                                                                
                                                                                
                                                        



Encounters

                    





                    Location                            Location Details                            Encounter

 Type                            Encounter Number                            Reason For

 Visit                            Attending Provider                            ADM Date

                            DC Date                            Status                

                                        Source                    

 

                          [facility] [place]                                              

                          Outpt Diag Services                            [id number]                

                                                Keyon Redduilar                             2015                                               

                                         [facility] [place]                                          

                          Outpt [facility]                            [id number]            

                                                      Keyon Redduilar                          

                    2015                                    

                                        [facility] [place]                    

 

                                                                            Outpatient                  

                    [id number]                                                        RASHAAD QUEVEDO                             2016                                      

                          [facility]                 

   

 

                                                                            Outpatient                  

                    [id number]                                                        EVE HOWELL

                             2017                                              

                          [facility]                    

 

                                                                            Outpatient                  

                    [id number]                                                        EVE HOWELL

                             2018                                              

                          [facility]                                                     

                    Outpatient                            [id number]                                  

                                                        2018                       

                    01/10/2018                                                         [facility]

                    

 

                          [place] - [facility]                               

                                        Keyon King MD: [id number], [address], [place] [address], Ph. [phone]                            [id number]

                                                        Keyon King              

                    2019                                                              

                                        [facility]                    

 

                                                                            Outpatient                  

                    [id number]                                                        E[facility] Howell

                             2019                                              

                          [facility]                                                     

                    Outpatient                            [id number]                                  

                                                        2019                                                         [facility]

                    



                                                                                
                                                                                
        



Procedures

                    





                    Procedure                            Code                            Date           

                          Perfomer                            Comments                        

                                        Source                    

 

                          Knee joint operation                            [id number]                         

                                                                                                    

 [facility] Group                    

 

                    Nose operation                            [id number]                                  

                                                                               Medical

 Group                    

 

                          Shoulder joint operations                            [id number]                    

                                                                                                   

                                         Medical Group                    

 

                    Tonsillectomy                            [id number]                                  

                                                                               Medical

 Group                    



                                                                                
                            



Assessment and Plan

                    





                                        No Data Provided for This Section                    



                                     



Plan of Care







                                        No Data Provided for This Section                    



                                                                



Social History

                    





                    Social History                            Date                            Source    

                

 

                                                                                    Smoking Status

             Never Smoker

                                   

                                 2019                            [facility]                    

 

                                        Social History TypeResponse

Substance Abuse

Use: None.

Exercise

Exercise frequency: Daily.

Employment/School

Status: Employed.  Work/School description: procurement.

Alcohol

Current

Smoking Status

Never smoker; Exposure to Tobacco Smoke None; Cigarette Smoking Last 365 Days 
No; Reg Smoking Cessation Counseling No

entered on: 2018                             [facility]   

                 

 

                          No data available for this section                            2015          

                                         [facility] [place]                    

 

                          No data available for this section                            2015          

                                         [facility] [place]                    



                                                                                
                                    



Family History

                    





                                        No Data Provided for This Section                    



                                                            



Advance Directives

                    





                                        No Data Provided for This Section                    



                                                            



Functional Status

                    





                                        No Data Provided for This Section

## 2019-08-08 ENCOUNTER — HOSPITAL ENCOUNTER (EMERGENCY)
Dept: HOSPITAL 88 - ER | Age: 54
LOS: 1 days | Discharge: HOME | End: 2019-08-09
Payer: COMMERCIAL

## 2019-08-08 VITALS — HEIGHT: 75 IN | BODY MASS INDEX: 28.6 KG/M2 | WEIGHT: 230 LBS

## 2019-08-08 DIAGNOSIS — R50.9: Primary | ICD-10-CM

## 2019-08-08 LAB
ALBUMIN SERPL-MCNC: 3.7 G/DL (ref 3.5–5)
ALBUMIN/GLOB SERPL: 1.3 {RATIO} (ref 0.8–2)
ALP SERPL-CCNC: 159 IU/L (ref 40–150)
ALT SERPL-CCNC: 31 IU/L (ref 0–55)
ANION GAP SERPL CALC-SCNC: 16.1 MMOL/L (ref 8–16)
BASOPHILS # BLD AUTO: 0 10*3/UL (ref 0–0.1)
BASOPHILS NFR BLD AUTO: 0.3 % (ref 0–1)
BUN SERPL-MCNC: 21 MG/DL (ref 7–26)
BUN/CREAT SERPL: 18 (ref 6–25)
CALCIUM SERPL-MCNC: 9.5 MG/DL (ref 8.4–10.2)
CHLORIDE SERPL-SCNC: 103 MMOL/L (ref 98–107)
CO2 SERPL-SCNC: 24 MMOL/L (ref 22–29)
DEPRECATED NEUTROPHILS # BLD AUTO: 5.6 10*3/UL (ref 2.1–6.9)
EGFRCR SERPLBLD CKD-EPI 2021: > 60 ML/MIN (ref 60–?)
EOSINOPHIL # BLD AUTO: 0.1 10*3/UL (ref 0–0.4)
EOSINOPHIL NFR BLD AUTO: 1.6 % (ref 0–6)
ERYTHROCYTE [DISTWIDTH] IN CORD BLOOD: 12.4 % (ref 11.7–14.4)
GLOBULIN PLAS-MCNC: 2.8 G/DL (ref 2.3–3.5)
GLUCOSE SERPLBLD-MCNC: 86 MG/DL (ref 74–118)
HCT VFR BLD AUTO: 38.5 % (ref 38.2–49.6)
HGB BLD-MCNC: 13.1 G/DL (ref 14–18)
LYMPHOCYTES # BLD: 0.8 10*3/UL (ref 1–3.2)
LYMPHOCYTES NFR BLD AUTO: 11.3 % (ref 18–39.1)
MCH RBC QN AUTO: 31.5 PG (ref 28–32)
MCHC RBC AUTO-ENTMCNC: 34 G/DL (ref 31–35)
MCV RBC AUTO: 92.5 FL (ref 81–99)
MONOCYTES # BLD AUTO: 0.7 10*3/UL (ref 0.2–0.8)
MONOCYTES NFR BLD AUTO: 9.5 % (ref 4.4–11.3)
NEUTS SEG NFR BLD AUTO: 76.9 % (ref 38.7–80)
PLATELET # BLD AUTO: 191 X10E3/UL (ref 140–360)
POTASSIUM SERPL-SCNC: 4.1 MMOL/L (ref 3.5–5.1)
RBC # BLD AUTO: 4.16 X10E6/UL (ref 4.3–5.7)
SODIUM SERPL-SCNC: 139 MMOL/L (ref 136–145)

## 2019-08-08 PROCEDURE — 80053 COMPREHEN METABOLIC PANEL: CPT

## 2019-08-08 PROCEDURE — 85025 COMPLETE CBC W/AUTO DIFF WBC: CPT

## 2019-08-08 PROCEDURE — 99284 EMERGENCY DEPT VISIT MOD MDM: CPT

## 2019-08-08 PROCEDURE — 36415 COLL VENOUS BLD VENIPUNCTURE: CPT

## 2019-08-08 PROCEDURE — 71045 X-RAY EXAM CHEST 1 VIEW: CPT

## 2019-08-08 PROCEDURE — 93971 EXTREMITY STUDY: CPT

## 2019-08-08 PROCEDURE — 96374 THER/PROPH/DIAG INJ IV PUSH: CPT

## 2019-08-08 PROCEDURE — 83605 ASSAY OF LACTIC ACID: CPT

## 2019-08-08 PROCEDURE — 87040 BLOOD CULTURE FOR BACTERIA: CPT

## 2019-08-08 RX ADMIN — SODIUM CHLORIDE STA MG: 900 INJECTION INTRAVENOUS at 22:34

## 2019-08-08 RX ADMIN — MORPHINE SULFATE ONE MG: 5 INJECTION INTRAMUSCULAR; INTRAVENOUS; SUBCUTANEOUS at 22:50

## 2019-08-08 NOTE — XMS REPORT
Clinical Summary

                             Created on: 2019



Pieter Thomas

External Reference #: [id number]

: 1965

Sex: Male



Demographics







                          Address                   [address] 

[place]  [address]

 

                          Home Phone                [phone]

 

                          Preferred Language        English

 

                          Marital Status            

 

                          Worship Affiliation     [id number]

 

                          Race                      White

 

                          Ethnic Group              Non-





Author







                          Author                    [facility]

 

                          Organization              [facility]

 

                          Address                   Unknown

 

                          Phone                     Unavailable







Support







                Name            Relationship    Address         Phone

 

                    Urvashi Thomas        [employer]                [address]

[place]  [address]                      [phone]







Care Team Providers







                    Care Team Member Name    Role                Phone

 

                    Keyon King MD    PCP                 [phone]







Allergies

No Known Allergies



Medications







                          End Date                  Status



              Medication     Sig          Dispensed     Refills      Start  



                                         Date  

 

                                                    Active



                 APRISO 0.375 gram 24 hr     TAKE 4          3                 



                     capsule             CAPSULES BY         6  



                                         MOUTH EVERY     



                                         DAY     

 

                                                    Active



                 HYDROcodone-acetaminophen     TAKE 1 TABLET      0                 



                     (XODOL) 5-300 mg per     BY MOUTH            8  



                           tablet                    EVERY 4 TO 6     



                                         HOURS AS     



                                         NEEDED FOR     



                                         PAIN     

 

                                                    Active



              tadalafil (CIALIS) 20 mg     Take 1 tablet     27 tablet     2              



                     tablet              (20 mg total)       8  



                                         by mouth     



                                         daily as     



                                         needed for     



                                         erectile     



                                         dysfunction.     

 

                                                    Active



              cetirizine-pseudoepHEDrin     Take 1 tablet     60 tablet     11             



                     e (ZyrTEC-D) 5-120 mg per     by mouth 2          8  



                           12 hr tablet              (two) times a     



                                         day.     

 

                                                    Active



              atorvastatin (LIPITOR) 20     TAKE 1 TABLET     90 tablet     1              



                     MG tablet           BY MOUTH            8  



                                         EVERY DAY     

 

                                                    Active



              dextromethorphan-guaifene     Take 1 tablet     28 each      0              



                     sin (MUCINEX DM)      by mouth 2          8  



                           mg tablet extended        (two) times a     



                           release 12 hr             day as needed     



                                         (congestion     



                                         post nasal     



                                         drip).     

 

                          2019                Active



              clotrimazole-betamethason     Apply        30 g         0              



                     e (LOTRISONE) 1-0.05 %     topically 2         8  



                           cream                     (two) times a     



                                         day.     

 

                          2018                Discontinued



              fluticasone (FLONASE) 50     USE 2 SPRAYS     48 mL        1              



                     mcg/actuation nasal spray     EACH NOSTRIL        7  



                                         EVERYDAY     

 

                          2018                Discontinued



              atorvastatin (LIPITOR) 20     TAKE 1 TABLET     90 tablet     0            05/15/201  



                     MG tablet           BY MOUTH            8  



                                         EVERY DAY     

 

                          2018                Discontinued



              dextromethorphan-guaifene     Take 1 tablet     28 each      0              



                     sin (MUCINEX DM)      by mouth 2          8  



                           mg tablet extended        (two) times a     



                           release 12 hr             day as needed     



                                         (congestion     



                                         post nasal     



                                         drip).     

 

                          2018                Discontinued



              zolpidem (AMBIEN) 10 mg     TAKE 1 TABLET     30 tablet     0              



                     tablet              BY MOUTH            8  



                                         NIGHTLY AS     



                                         NEEDED FOR     



                                         SLEEP     

 

                          2018                Discontinued



                     UNABLE TO FIND      Zirtec D            0   

 

                          10/09/2018                Discontinued



              escitalopram (LEXAPRO) 10     Take 1 tablet     30 tablet     2              



                     MG tablet           (10 mg total)       8  



                                         by mouth     



                                         daily for 90     



                                         days.     

 

                          2018                Discontinued



              clonAZEPAM (KlonoPIN) 0.5     Take 1 tablet     40 tablet     0              



                     MG tablet           (0.5 mg             8  



                                         total) by     



                                         mouth 3     



                                         (three) times     



                                         a day as     



                                         needed for     



                                         anxiety     



                                         (insomnia)     



                                         for up to 30     



                                         days.     

 

                          2018                



              promethazine-codeine     Take 5 mL by     120 mL       0              



                     (PHENERGAN with CODEINE)     mouth every 6       8  



                           6.25-10 mg/5 mL syrup     (six) hours     



                                         as needed for     



                                         cough for up     



                                         to 10 days.     

 

                          10/09/2018                Discontinued



              dextromethorphan-guaifene     Take 1 tablet     28 each      0              



                     sin (MUCINEX DM)      by mouth 2          8  



                           mg tablet extended        (two) times a     



                           release 12 hr             day as needed     



                                         (congestion     



                                         post nasal     



                                         drip).     

 

                          2018                



              levoFLOXacin (LEVAQUIN)     Take 1 tablet     5 tablet     0              



                     750 MG tablet       (750 mg             8  



                                         total) by     



                                         mouth daily     



                                         for 5 days.     

 

                          2018                Discontinued



              atorvastatin (LIPITOR) 20     TAKE 1 TABLET     90 tablet     0            /201  



                     MG tablet           BY MOUTH            8  



                                         EVERY DAY     

 

                          2018                Discontinued



              clonAZEPAM (KlonoPIN) 0.5     TAKE 1 TABLET     40 tablet     0            /201  



                     MG tablet           BY MOUTH 3          8  



                                         TIMES A DAY     



                                         AS NEEDED FOR     



                                         ANXIETY/INSOM     



                                         MAHENDRA     

 

                          10/20/2018                



              clonAZEPAM (KlonoPIN) 0.5     TAKE 1 TABLET     30 tablet     0              



                     MG tablet           BY MOUTH 3          8  



                                         TIMES A DAY     



                                         AS NEEDED FOR     



                                         ANXIETY/INSOM     



                                         MAHENDRA     

 

                          2018                Discontinued



                     amoxicillin (AMOXIL) 500       0                   10/08/201  



                           MG capsule                8  

 

                          10/14/2018                



              azithromycin (ZITHROMAX)     Take 2       6 tablet     0            10/09/201  



                     250 MG tablet       tablets the         8  



                                         first day,     



                                         then 1 tablet     



                                         daily for 4     



                                         days.     

 

                          2018                Discontinued



              dextromethorphan-guaifene     Take 1 tablet     28 each      0            10/09/201  



                     sin (MUCINEX DM)      by mouth 2          8  



                           mg tablet extended        (two) times a     



                           release 12 hr             day as needed     



                                         (congestion     



                                         post nasal     



                                         drip).     

 

                          10/19/2018                



              promethazine-codeine     Take 5 mL by     120 mL       0            10/09/201  



                     (PHENERGAN with CODEINE)     mouth every 6       8  



                           6.25-10 mg/5 mL syrup     (six) hours     



                                         as needed for     



                                         cough for up     



                                         to 10 days.     

 

                          2018                Discontinued



              escitalopram (LEXAPRO) 10     Take 1 tablet     30 tablet     2            10/09/201  



                     MG tablet           (10 mg total)       8  



                                         by mouth     



                                         daily for 90     



                                         days.     

 

                          2018                Discontinued



              escitalopram (LEXAPRO) 10     Take 1 tablet     90 tablet     0              



                     MG tablet           (10 mg total)       8  



                                         by mouth     



                                         daily for 90     



                                         days.     

 

                          2018                



              clonAZEPAM (KlonoPIN) 0.5     Take 1 tablet     30 tablet     1              



                     MG tablet           (0.5 mg             8  



                                         total) by     



                                         mouth 2 (two)     



                                         times a day     



                                         as needed for     



                                         anxiety     



                                         (insomnia)     



                                         for up to 30     



                                         days.     

 

                          2018                



              mupirocin (BACTROBAN) 2 %     Apply        30 g         0              



                     ointment            topically to        8  



                                         left nostril     



                                         twice a day     

 

                          2018                Discontinued



              mometasone (ELOCON) 0.1 %     Apply        30 g         0              



                     cream               topically to        8  



                                         left leg rash     



                                         once daily     

 

                          2018                



              ciprofloxacin (CIPRO) 500     Take 1 tablet     16 tablet     0              



                     MG tablet           (500 mg             8  



                                         total) by     



                                         mouth 2 (two)     



                                         times a day     



                                         for 8 days.     

 

                          2018                



              metroNIDAZOLE (FLAGYL)     Take 1 tablet     24 tablet     0              



                     500 MG tablet       (500 mg             8  



                                         total) by     



                                         mouth 3     



                                         (three) times     



                                         a day for 8     



                                         days.     

 

                          2018                



              acetaminophen-codeine     Take 1 tablet     5 tablet     0              



                     (TYLENOL WITH CODEINE #3)     by mouth            8  



                           300-30 mg per tablet      every 6 (six)     



                                         hours as     



                                         needed for     



                                         moderate pain     



                                         for up to 5     



                                         doses.     

 

                          2019                



              azithromycin (ZITHROMAX)     Take 2       6 tablet     0              



                     250 MG tablet       tablets the         8  



                                         first day,     



                                         then 1 tablet     



                                         daily for 4     



                                         days.     

 

                          2019                



              promethazine-codeine     Take 5 mL by     120 mL       0              



                     (PHENERGAN with CODEINE)     mouth every 6       8  



                           6.25-10 mg/5 mL syrup     (six) hours     



                                         as needed for     



                                         cough for up     



                                         to 10 days.     

 

                          2019                



              escitalopram (LEXAPRO) 10     Take 1 tablet     90 tablet     1              



                     MG tablet           (10 mg total)       8  



                                         by mouth     



                                         daily for 90     



                                         days.     

 

                          2019                



              zolpidem (AMBIEN) 10 mg     Take 1 tablet     30 tablet     1              



                     tablet              (10 mg total)       8  



                                         by mouth     



                                         nightly as     



                                         needed for     



                                         sleep for up     



                                         to 30 days.     









                                        Status



            Hospital, Clinic, or     Ordered Dose     Route      Frequency     Start      End Date 



                           Other Facility            Date  



                                         Administered Medication      

 

                                        Ended



            methylPREDNISolone     40 mg      IM         once       20 



                     acetate (DEPO-MEDROL)        18                  8 



                                         injection 40      



                                         mgIndications: Acute      



                                         frontal sinusitis,      



                                         recurrence not specified      

 

                                        Ended



            methylPREDNISolone     40 mg      IM         once       10/09/20     10/09/201 



                     acetate (DEPO-MEDROL)        18                  8 



                                         injection 40      



                                         mgIndications: Acute      



                                         bronchitis, unspecified      



                                         organism      







Active Problems







 



                           Problem                   Noted Date

 

 



                           Colitis                   12/10/2018

 

 



                           Left tennis elbow         05/15/2017

 

 



                           Right elbow pain          2017

 

 



                           Lateral epicondylitis of right elbow     2017

 

 



                           Lateral epicondylitis of both elbows     10/03/2016







Encounters







                          Care Team                 Description



                     Date                Type                Specialty  

 

                                        



Keyon King MD                



                     2019          Refill              Family Medicine  

 

                                        



Keyon King MD                



                     2019          Refill              Family Medicine  

 

                                        



Keyon King MD                



                     2019          Refill              Family Medicine  

 

                                        



Keyon King MD               Psychophysiological insomnia (Primary Dx); 

Tinea corporis; 

Acute secretory otitis media of both ears; 

Anemia due to other cause, not classified; 

Allergic rhinitis, unspecified seasonality, unspecified trigger; 

Panic attack as reaction to stress; 

Mixed hyperlipidemia; 

Diverticulitis of large intestine without perforation or abscess without 
bleeding; 

Ulcerative colitis without complications, unspecified location (HCC); 

Weight loss; 

BMI 27.0-27.9,adult



                     2018          Office Visit        Family Medicine  

 

                                        



Aleksey Tan MD Bhatt, Sandeep, MD                      Colitis (Primary Dx)



                     2018          [facility]  



                           -                         Encounter   



                                         2018    

 

                                        



Keyon King MD                



                     2018          Refill              [facility] Medicine  

 

                                        



Rama Luna MA                       



                     2018          Telephone           [facility] Medicine  

 

                                        



Keyon King MD               Elevated transaminase level (Primary Dx)



                     2018          Orders Only         Family Keyon Moy MD               Routine check-up (Primary Dx); 

Mixed hyperlipidemia; 

Panic attack as reaction to stress; 

Dermatitis; 

Cellulitis of nostril; 

Psychophysiological insomnia; 

BMI 27.0-27.9,adult; 

Elevated blood pressure reading without diagnosis of hypertension; 

Erectile dysfunction, unspecified erectile dysfunction type; 

Chronic sinusitis, unspecified location; 

Lateral epicondylitis of both elbows; 

Acute secretory otitis media of both ears; 

Pharyngitis, unspecified etiology; 

Nocturia; 

Drug-induced constipation; 

Weight gain, abnormal



                     2018          Office Visit        Family Medicine  

 

                                        



Rama Luna MA                       



                     2018          Telephone           [facility]  

 

                                        



Keyon King MD                



                     10/15/2018          Refill              [facility] Keyon Moy MD               Acute bronchitis, unspecified organism (Primary Dx); 

Pharyngitis, unspecified etiology; 

Other fatigue; 

Panic attack as reaction to stress; 

Insomnia, unspecified type; 

Other specified anxiety disorders; 

Left ear pain



                     10/09/2018          Office Visit        Family Keyon Moy MD                



                     2018          Refill              [facility] Keyon Moy MD                



                     2018          Refill              [facility] Keyon Moy MD                



                     2018          Telephone           [facility] Keyon Moy MD                



                     2018          Refill              [facility] Keyon Moy MD                



                     2018          Telephone           [facility]  

 

                                        



Keyon King MD               Acute frontal sinusitis, recurrence not specified (Primary

 Dx); 

Acute secretory otitis media of both ears; 

Pharyngitis, unspecified etiology; 

Anxiety; 

Psychophysiological insomnia; 

Panic attacks; 

Other fatigue



                     2018          Office Visit        Family Medicine  



after 2018



Family History







   



                 Medical History     Relation        Name            Comments

 

   



                           Diabetes                  Brother  

 

   



                           Hypertension              Brother  

 

   



                     Diabetes            Father              Flo Thomas 

 

   



                     Hearing loss        Father              Flo Thomas 

 

   



                     Heart disease       Father              Flo Thomas 

 

   



                 Stroke          Father          Flo          



                                         William 

 

   



                 Cancer          Mother          4th Stage       2015



                                         Breast 



                                         Cancer 

 

   



                     Diabetes            Paternal            Lenin Thomas 



                                         Grandfather  

 

   



                 Stroke          Paternal        Lenin Thomas     1978



                                         Grandfather  

 

   



                 Miscarriages /     Paternal        Alyssa       late 1930's



                     Stillbirths         Grandmother         William 

 

   



                 Vision loss     Paternal        Alyssa       Legally blind



                           Grandmother               William 









   



                 Relation        Name            Status          Comments

 

   



                                         Brother   

 

   



                           Father                    Flo Thomas  

 

   



                           Mother                    4th Stage  



                                         Breast Cancer  

 

   



                           Paternal Grandfather      Lenin Thomas  

 

   



                           Paternal Grandmother      Alyssa Thomas  







Social History







                                        Date



                 Tobacco Use     Types           Packs/Day       Years Used 

 

                                         



                                         Never Smoker    

 

    



                                         Smokeless Tobacco: Never   



                                         Used   









   



                 Alcohol Use     Drinks/Week     oz/Week         Comments

 

   



                     Yes                 2 Glasses of        Socially



                                         wine  



                                         2 Cans of  



                                         beer  









 



                           Sex Assigned at Birth     Date Recorded

 

 



                                         Not on file 









                                        Industry



                           Job Start Date            Occupation 

 

                                        Not on file



                           Not on file               Not on file 









                                        Travel End



                           Travel History            Travel Start 

 





                                         No recent travel history available.







Last Filed Vital Signs







                                        Time Taken



                           Vital Sign                Reading 

 

                                        2018  9:40 AM CST



                           Blood Pressure            136/88 

 

                                        2018  9:40 AM CST



                           Pulse                     98 

 

                                        2018  9:40 AM CST



                           Temperature               36.7 C (98.1 F) 

 

                                        2018 10:49 AM CST



                           Respiratory Rate          15 

 

                                        2018  9:40 AM CST



                           Oxygen Saturation         100% 

 

                                        -



                           Inhaled Oxygen            - 



                                         Concentration  

 

                                        2018  9:40 AM CST



                           Weight                    99.8 kg (220 lb) 

 

                                        2018  9:40 AM CST



                           Height                    190.5 cm (6' 3") 

 

                                        2018  9:40 AM CST



                           Body Mass Index           27.5 







Plan of Treatment







   



                 Health Maintenance     Due Date        Last Done       Comments

 

   



                           COLONOSCOPY SCREENING     2017  

 

   



                           SHINGLES VACCINES (#1)     2017  

 

   



                           INFLUENZA VACCINE         2019  







Procedures







                                        Comments



                 Procedure Name     Priority        Date/Time       Associated Diagnosis 

 

                                        



Results for this procedure are in the results section.



                     HC COMPLETE BLD COUNT     Routine             2018  



                           W/AUTO DIFF               5:19 AM CST  

 

                                        



Results for this procedure are in the results section.



                     ESTIMATED GFR       Routine             2018  



                                         5:36 AM CST  

 

                                        



Results for this procedure are in the results section.



                     BASIC METABOLIC PANEL     Routine             2018  



                                         5:36 AM CST  

 

                                        



Results for this procedure are in the results section.



                     HC COMPLETE BLD COUNT     Routine             2018  



                           W/AUTO DIFF               5:36 AM CST  

 

                                        



Results for this procedure are in the results section.



                     GASTROINTESTINAL PANEL     Routine             12/10/2018  



                                         11:52 AM CST  

 

                                        



Results for this procedure are in the results section.



                     ESTIMATED GFR       Routine             12/10/2018  



                                         5:02 AM CST  

 

                                        



Results for this procedure are in the results section.



                     COMPREHENSIVE METABOLIC     Routine             12/10/2018  



                           PANEL                     5:02 AM CST  

 

                                        



Results for this procedure are in the results section.



                     HC COMPLETE BLD COUNT     Routine             12/10/2018  



                           W/AUTO DIFF               5:02 AM CST  

 

                                        



Results for this procedure are in the results section.



                     CT ABDOMEN PELVIS W     STAT                2018  



                           CONTRAST                  11:59 PM CST  

 

                                        



Results for this procedure are in the results section.



                     ECG 12-LEAD         Routine             2018  



                                         10:16 PM CST  

 

                                        



Results for this procedure are in the results section.



                     ECG ED PRELIMINARY     Routine             2018  



                           INTERPRETATION            10:14 PM CST  

 

                                        



Results for this procedure are in the results section.



                     ESTIMATED GFR       STAT                2018  



                                         9:46 PM CST  

 

                                        



Results for this procedure are in the results section.



                     URINALYSIS SCREEN AND     STAT                2018  



                           MICROSCOPY, WITH REFLEX      9:46 PM CST  



                                         TO CULTURE    

 

                                        



Results for this procedure are in the results section.



                     LIPASE LEVEL        STAT                2018  



                                         9:46 PM CST  

 

                                        



Results for this procedure are in the results section.



                     AMYLASE LEVEL       STAT                2018  



                                         9:46 PM CST  

 

                                        



Results for this procedure are in the results section.



                     COMPREHENSIVE METABOLIC     STAT                2018  



                           PANEL                     9:46 PM CST  

 

                                        



Results for this procedure are in the results section.



                     HC COMPLETE BLD COUNT     STAT                2018  



                           W/AUTO DIFF               9:46 PM CST  

 

                                        



Results for this procedure are in the results section.



                 URINALYSIS, COMPLETE,     Routine         2018      Erectile dysfunction, 



                     WITH REFLEX TO CULTURE      11:12 AM CST        unspecified erectile 



                                         dysfunction type 

 

                                        



Results for this procedure are in the results section.



                 TESTOSTERONE LEVEL, FREE     Routine         2018      Erectile dysfunction, 



                     AND TOTAL, MALE      11:12 AM CST        unspecified erectile 



                                         dysfunction type 

 

                                        



Results for this procedure are in the results section.



                 TSH REFLEX TO T4F     Routine         2018      Erectile dysfunction, 



                           11:12 AM CST              unspecified erectile 



                                         dysfunction type 

 

                                        



Results for this procedure are in the results section.



                 PROSTATE SPECIFIC ANTIGEN     Routine         2018      Erectile dysfunction, 



                           11:12 AM CST              unspecified erectile 



                                         dysfunction type 

 

                                        



Results for this procedure are in the results section.



                 LIPID PANEL     Routine         2018      Mixed hyperlipidemia 



                                         11:12 AM CST  

 

                                        



Results for this procedure are in the results section.



                 COMPREHENSIVE METABOLIC     Routine         2018      Mixed hyperlipidemia 



                           PANEL                     11:12 AM CST  

 

                                        



Results for this procedure are in the results section.



                 CBC WITH PLATELET AND     Routine         2018      Routine check-up 



                           DIFFERENTIAL              11:12 AM CST  

 

                                        



Results for this procedure are in the results section.



                 CREATINE KINASE, TOTAL     Routine         2018      Mixed hyperlipidemia 



                           (CPK)                     11:12 AM CST  



after 2018



Results

* CBC with platelet and differential (2018  5:19 AM CST)



Only the most recent of 5 results within the time period is included.





    



              Component     Value        Ref Range     Performed At     Pathologist



                                         Signature

 

    



                 WBC             5.74            4.50 - 11.00 k/uL     [facility] 

 

    



                 RBC             4.34 (L)        4.40 - 6.00 m/uL     [facility] 

 

    



                 HGB             13.2 (L)        14.0 - 18.0 g/dL     [facility] 

 

    



                 HCT             39.8 (L)        41.0 - 51.0 %     [facility] 

 

    



                 MCV             91.7            82.0 - 100.0 fL     [facility] 

 

    



                 MCH             30.4            27.0 - 34.0 pg     [facility] 

 

    



                 MCHC            33.2            31.0 - 37.0 g/dL     [facility] 

 

    



                 RDW - SD        45.5            37.0 - 55.0 fL     [facility] 

 

    



                 MPV             9.1             8.8 - 13.2 fL     [facility] 

 

    



                 Platelet count     170             150 - 400 k/uL     [facility] 

 

    



                 Nucleated RBC     0.00            /100 WBC        [facility] 

 

    



                 Neutrophils     65.8            39.0 - 69.0 %     [facility] 

 

    



                 Lymphocytes     16.9 (L)        25.0 - 45.0 %     [facility] 

 

    



                 Monocytes       13.4 (H)        0.0 - 10.0 %     [facility] 

 

    



                 Eosinophils     3.1             0.0 - 5.0 %     [facility] 

 

    



                 Basophils       0.3             0.0 - 1.0 %     [facility] 













                                         Specimen

 





                                         Blood









   



                 Performing Organization     Address         City[facility]     Phone Number

 

   



                     [address] [place] [address] 



                                         PATHOLOGY AND GENOMIC   



                                         MEDICINE   

 

   



                     [address]      [address]   





* Estimated GFR (2018  5:36 AM CST)



Only the most recent of 3 results within the time period is included.





    



              Component     Value        Ref Range     Performed At     Pathologist



                                         Signature

 

    



                 Estimated GFR     69              mL/min/1.73 m2     [place] 



                           Comment:                  [facility] 



                                         rpretation   



                                         G1   



                                         >=90 Normal or high   



                                         G2   



                                         60-89Mildly decreased   



                                         [id number]   



                                         Mildly to moderately   



                                         decreased   



                                         [id number]   



                                         Moderately to severely   



                                         decreased   



                                         G4   



                                         15-29Severely   



                                         decreased   



                                         G5   



                                         <15Kidney failure   



                                         The eGFR was calculated using   



                                         the Chronic Kidney Disease   



                                         Epidemiology Collaboration   



                                         (CKD-EPI) equation.   



                                         Interpretation is based on   



                                         recommendations of the   



                                         National Kidney   



                                         Foundation-Kidney Disease   



                                         Outcomes Quality   



                                         Initiative (NKF-KDOQI)   



                                         published in 2014.   













                                         Specimen

 





                                         Plasma specimen









   



                 Performing Organization     Address         City/State[facility]ode     Phone Number

 

   



                     [address] [place] [address] 



                                         PATHOLOGY AND GENOMIC   



                                         MEDICINE   

 

   



                     [address]      [address]   





* Basic metabolic panel (2018  5:36 AM CST)





    



              Component     Value        Ref Range     Performed At     Pathologist



                                         Signature

 

    



                 Sodium          142             135 - 148 mEq/L     [facility] 

 

    



                 Potassium       4.2             3.5 - 5.0 mEq/L     [facility] 

 

    



                 Chloride        105             98 - 112 mEq/L     [facility] 

 

    



                 CO2             30              24 - 31 mEq/L     [facility] 

 

    



                 Anion gap       7@ANIO          7 - 15 mEq/L     [facility] 

 

    



                 BUN             6               6 - 20 mg/dL     [facility] 

 

    



                 Creatinine      1.20            0.70 - 1.20 mg/dL     [facility] 

 

    



                 Glucose         113 (H)         65 - 99 mg/dL     [facility] 

 

    



                 Calcium         8.8             8.3 - 10.2 mg/dL     [facility] 













                                         Specimen

 





                                         Plasma specimen









   



                 Performing Organization     Address         City/[facility] Cent[facility]ode     Phone Number

 

   



                     [place] [address] [place] [address] 



                                         PATHOLOGY AND GENOMIC   



                                         MEDICINE   

 

   



                     [facility]     [address]      [place] [id number] 



                                         [facility]   





* Gastrointestinal panel (12/10/2018 11:52 AM CST)





    



              Component     Value        Ref Range     Performed At     Pathologist



                                         [facility]

 

    



                     Gastrointestina     Negative for all pathogens      [facility] panel             tested:             [facility] 



                           Negative for Salmonella      [facility] 



                                         Negative for Campylobacter   



                                         Negative for Diarrheagenic E   



                                         coli/Shigella   



                                         Negative for Shiga-like   



                                         toxin-producing E coli   



                                         Negative for Plesiomonas   



                                         shigelloides   



                                         Negative for Yersinia   



                                         enterocolitica   



                                         Negative for Vibrio species   



                                         Negative for Clostridium   



                                         difficile (Toxin A/B)   



                                         Negative for Cryptosporidium   



                                         Negative for Giardia lamblia   



                                         Negative for Cyclospora   



                                         cayeteanensis   



                                         Negative for Entamoeba   



                                         histolytica   



                                         Negative for Adenovirus F   



                                         40/41   



                                         Negative for Astrovirus   



                                         Negative for Norovirus GI/GII   



                                         Negative for Rotavirus A   



                                         Negative for Sapovirus   



                                         Negative for Clostridium   



                                         difficile toxin   



                                         Negative for E coli 0157   



                                         This real-time PCR assay   



                                         detects the presence of   



                                         nucleic   



                                         acids (RNA or DNA) for the   



                                         gastrointestinal pathogens   



                                         listed.   



                                         A result of "Not-detected"   



                                         does not exclude the   



                                         possibility   



                                         of the presence of one or more   



                                         pathogens at concentrations   



                                         less than the detectable   



                                         limits of the assay.   



                                         Comment:   



                                         Specimen Information   



                                         Specimen Source: Stool   



                                         Specimen Site: Nonpreserved   













                                         Specimen

 





                                         Stool - Nonpreserved









   



                 Performing Organization     Address         City/State/Zipcode     Phone Number

 

   



                     [facility] DEPARTMENT OF     [address] [place] [address] 



                                         PATHOLOGY AND GENOMIC   



                                         MEDICINE   

 

   



                     [address]   





* Comprehensive metabolic panel (12/10/2018  5:02 AM CST)



Only the most recent of 3 results within the time period is included.





    



              Component     Value        Ref Range     Performed At     Pathologist



                                         [facility]

 

    



                 Sodium          146             135 - 148 mEq/L     [facility] 

 

    



                 Potassium       4.7             3.5 - 5.0 mEq/L     [facility] 

 

    



                 Chloride        107             98 - 112 mEq/L     [facility] 

 

    



                 CO2             29              24 - 31 mEq/L     [facility] 

 

    



                 Anion gap       10@ANIO         7 - 15 mEq/L     [facility] 

 

    



                 BUN             9               6 - 20 mg/dL     [facility] 

 

    



                 Creatinine      1.20            0.70 - 1.20 mg/dL     [facility] 

 

    



                 Glucose         116 (H)         65 - 99 mg/dL     [facility] 

 

    



                 Calcium         9.0             8.3 - 10.2 mg/dL     [facility] 

 

    



                 Protein         6.4             6.3 - 8.3 g/dL     [facility] 



                           Comment:                  [facility] 



                                          4.6-7.0 g/dL   



                                         1   



                                         week   



                                          4.4-7.6 g/dL   



                                         7   



                                         months-1year   



                                         5.1-7.3 g/dL   



                                         1-2   



                                         years5.6-7   



                                         .5 g/dL   



                                         >3   



                                         years6.0-8   



                                         .0 g/dL   



                                            



                                          6.3-8.3 g/dL   

 

    



                 Albumin         4.0             3.5 - 5.0 g/dL     [facility] 

 

    



                 A/G ratio       1.7             0.7 - 3.8       [facility] 

 

    



                 Alkaline        89              40 - 129 U/L     [facility] 



                           phosphatase               [facility] 

 

    



                 AST             22              10 - 50 U/L     [facility] 

 

    



                 ALT             25              5 - 50 U/L      [facility] 

 

    



                 Total bilirubin     0.4             0.0 - 1.2 mg/dL     [facility] 













                                         Specimen

 





                                         Plasma specimen









   



                 Performing Organization     Address         City/State/[facility]ode     Phone Number

 

   



                     [facility]     [id number] [place] [address]      [place] [address] 



                                         PATHOLOGY AND GENOMIC   



                                         MEDICINE   

 

   



                     [facility]     [id number] [place] [address]      [place] [address] 



                                         [facility]   





* CT Abdomen Pelvis W Contrast (2018 11:59 PM CST)









                                         Specimen

 











 



                           Narrative                 Performed At

 

 



                           EXAMINATION:CT ABDOMEN PELVIS W CONTRAST      [facility]



                                         CLINICAL HISTORY:Abd paindiverticulitis suspected 



                                         TECHNIQUE: Multiple axial images of the abdomen and pelvis were obtained 



                                         following intravenous administration of iodinated contrast. Sagittal and coronal

 



                                         computerized reformatted images were also obtained. 



                                         CT imaging was performed with iterative reconstruction technique and/or 



                                         automated exposure control to reduce radiation dose. 



                                         COMPARISON:2016 



                                         IMPRESSION: 



                                         Mild bibasilar atelectasis/scarring. Air cysts of the left lower lobe is seen 



                                         with small fluid/debris layering within, unchanged since 2016. 



                                         Liver, gallbladder, spleen, pancreas, adrenal glands are normal. 



                                         Kidneys, ureters are normal. Bladder is unremarkable. 



                                         No free intraperitoneal fluid or air. Atherosclerotic vascular calcifications 



                                         are seen. 



                                         Diverticulosis is seen. Air-fluid levels are seen throughout large bowel, with 





                                         mild wall thickening and adjacent fat stranding, compatible with mild infectious

 



                                         or inflammatory colitis. Appendix is normal. No gastrointestinal tract 



                                         obstruction. 



                                         Some prominent posterior mediastinal periaortic lymph nodes are unchanged since

 



                                         2016. 



                                         No acute osseous abnormalities. 



                                         CONCLUSION: 



                                         Findings are compatible with mild infectious or inflammatory colitis. 



                                         [facility]-[id number] 









                                        Procedure Note

 

                                        



Hm Interface, Radiology Results Incoming - 12/10/2018 12:13 AM CST



EXAMINATION:  CT ABDOMEN PELVIS W CONTRAST



CLINICAL HISTORY:  Abd pain  diverticulitis suspected



TECHNIQUE: Multiple axial images of the abdomen and pelvis were obtained 
following intravenous administration of iodinated contrast. Sagittal and coronal
computerized reformatted images were also obtained.



CT imaging was performed with iterative reconstruction technique and/or 
automated exposure control to reduce radiation dose.



COMPARISON:  2016





IMPRESSION:



Mild bibasilar atelectasis/scarring. Air cysts of the left lower lobe is seen 
with small fluid/debris layering within, unchanged since 2016.



Liver, gallbladder, spleen, pancreas, adrenal glands are normal.



Kidneys, ureters are normal. Bladder is unremarkable.



No free intraperitoneal fluid or air. Atherosclerotic vascular calcifications 
are seen.



Diverticulosis is seen. Air-fluid levels are seen throughout large bowel, with 
mild wall thickening and adjacent fat stranding, compatible with mild infectious
or inflammatory colitis. Appendix is normal. No gastrointestinal tract 
obstruction.



Some prominent posterior mediastinal periaortic lymph nodes are unchanged since 
2016.



No acute osseous abnormalities.



CONCLUSION:



Findings are compatible with mild infectious or inflammatory colitis.





[facility]-[id number]











   



                 Performing Organization     Address         City/[facility] Centercode     Phone Number

 

   



                      [facility]          [address] [place] [address] 





* ECG 12 lead (2018 10:16 PM CST)





    



              Component     Value        Ref Range     Performed At     Pathologist



                                         Signature

 

    



                     Ventricular         105                 [facility] 



                                         rate    

 

    



                     Atrial rate         105                 [facility] 

 

    



                     IA interval         122                 [facility] 

 

    



                     QRSD interval       82                  [facility] 

 

    



                     QT interval         326                 [facility] 

 

    



                     QTC interval        430                 [facility] 

 

    



                     P axis 1            52                  [facility] 

 

    



                     QRS axis 1          49                  [facility] 

 

    



                     T wave axis         19                  [facility] 

 

    



                     EKG impression      Sinus tachycardia-Otherwise      [facility] 



                                         normal ECG-In automated   



                                         comparison with ECG of   



                                         2017 09:16,-No   



                                         significant change was   



                                         found-Electronically Signed By   



                                         Kristen Kate MD ([id number]) on   



                                         12/10/2018 10:48:59 AM   













                                         Specimen

 











 



                           Narrative                 Performed At

 

 



                                         This result has an attachment that is not available. 









   



                 Performing Organization     Address         City/[facility] Centercode     Phone Number

 

   



                     [facility]            [address] [place] [address] 





* ECG ED Preliminary Interpretation - Not an Order (2018 10:14 PM CST)





 



                           Narrative                 Performed At

 

 



                                         Aleksey Tan MD 12/10/2018 12:56 AM 



                                         ECG ED Preliminary Interpretation - Not an Order 



                                         Performed by: Aleksey Tan MD 



                                         Authorized by: Aleksey Tan MD 



                                         ECG reviewed by ED Physician in the absence of a cardiologist: yes 



                                         Interpretation: 



                                         Interpretation: normal 



                                         Rate: 



                                         ECG rate:105 



                                         ECG rate assessment: tachycardic 



                                         Rhythm: 



                                         Rhythm: sinus tachycardia 



                                         Ectopy: 



                                         Ectopy: none 



                                         QRS: 



                                         QRS axis:Normal 



                                         Conduction: 



                                         Conduction: normal 



                                         ST segments: 



                                         ST segments:Non-specific 



                                         T waves: 



                                         T waves: normal 





* Urinalysis screen and microscopy, with reflex to culture (2018  9:46 PM 
  CST)





    



              Component     Value        Ref Range     Performed At     Pathologist



                                         Signature

 

    



                     Specimen site       Clean catch         [facility] 

 

    



                     Color, UA           Yellow              [facility] 

 

    



                     Appearance, UA      Clear               [facility] 

 

    



                 Specific        1.017           1.001 - 1.035     [place] 



                           gravity, [facility] 

 

    



                 pH, UA          6.0             5.0 - 8.5       [facility] 

 

    



                 Protein, UA     Negative        Negative        [facility] 

 

    



                 Glucose, UA     Negative        Negative        [facility] 

 

    



                 Ketones, UA     Negative        Negative        [facility] 

 

    



                 Bilirubin, UA     Negative        Negative        [facility] 

 

    



                 Blood, UA       Negative        Negative        [facility] 

 

    



                 Nitrite, UA     Negative        Negative        [facility] 

 

    



                 Urobilinogen,     Negative        <2.0            [facility] 

 

    



                 Leukocyte       Negative        Negative        [place] 



                           esterase, UA              [facility] 

 

    



                 Round           Few             0 - 1 /HPF      [place] 



                           epithelial                [facility] 



                           [facility] 

 

    



                 WBC, UA         0-5             0 - 1 /HPF      [facility] 

 

    



                 RBC, UA         0-5             0 - 5 /HPF      [facility] 

 

    



                 Bacteria, UA     Trace           None seen       [facility] 

 

    



                     Yeast, UA           None seen           [facility] 

 

    



                     Yeast with          None seen           [place] 



                           [facility] 

 

    



                 Hyaline casts,     3-5             /LPF            [facility] 













                                         Specimen

 





                                         Urine









   



                 Performing Organization     Address         City/State/Zipcode     Phone Number

 

   



                     [facility] [address] [place] [address] 



                                         PATHOLOGY AND GENOMIC   



                                         MEDICINE   

 

   



                     [address]      [address]   





* Lipase level (2018  9:46 PM CST)





    



              Component     Value        Ref Range     Performed At     Pathologist



                                         Signature

 

    



                 Lipase          24              13 - 60 U/L     [facility] 













                                         Specimen

 





                                         Plasma specimen









   



                 Performing Organization     Address         City/[facility] Centercode     Phone Number

 

   



                     [address] [place] [address] 



                                         PATHOLOGY AND GENOMIC   



                                         MEDICINE   

 

   



                     [address]      [address]   





* Amylase level (2018  9:46 PM CST)





    



              Component     Value        Ref Range     Performed At     Pathologist



                                         Signature

 

    



                 Amylase         80 (H)          13 - 73 U/L     [facility] 













                                         Specimen

 





                                         Plasma specimen









   



                 Performing Organization     Address         City/[facility]     Phone Number

 

   



                     [address] [place] [address] 



                                         PATHOLOGY AND [facility]   



                                         MEDICINE   

 

   



                     [address]      [address]   





* URINALYSIS, COMPLETE, WITH REFLEX TO CULTURE (2018 11:12 AM CST)





    



              Component     Value        Ref Range     Performed At     Pathologist



                                         Signature

 

    



                 Color, UA       DARK YELLOW     YELLOW          [facility] 



                                         [facility] 



                                         [place] 

 

    



                 Appearance      CLEAR           CLEAR           [facility] 



                                         [place] 

 

    



                 Specific        1.021           1.001 - 1.035     [facility] 



                           gravity, urine            [facility] 



                                         [place] 

 

    



                 pH, urine       7.5             5.0 - 8.0       [facility] 



                                         [place] 

 

    



                 Glucose, urine     NEGATIVE        NEGATIVE        [facility] 



                                         [place] 

 

    



                 Bilirubin, UA     NEGATIVE        NEGATIVE        [facility] 



                                         [place] 

 

    



                 Ketones, UA     NEGATIVE        NEGATIVE        [facility] 



                                         [place] 

 

    



                 Occult blood,     NEGATIVE        NEGATIVE        [facility] 



                           urine                     [facility] 



                                         [place] 

 

    



                 Protein, UA     NEGATIVE        NEGATIVE        [facility] 



                                         [place] 

 

    



                 Nitrite, UA     NEGATIVE        NEGATIVE        [facility] 



                                         [place] 

 

    



                 Leukocyte       NEGATIVE        NEGATIVE        [facility] 



                           esterase, UA              [facility] 



                                         [place] 

 

    



                 WBC, UA         NONE SEEN       < OR=5 /HPF     [facility] 



                                         [place] 

 

    



                 RBC, UA         NONE SEEN       < OR=2 /HPF     [facility] 



                                         [place] 

 

    



                 Squamous        NONE SEEN       < OR=5 /HPF     [facility] 



                           epithelial                [facility] 



                           cells, UA                 [place] 

 

    



                 Bacteria, UA     NONE SEEN       NONE SEEN /HPF     [facility] 



                                         [place] 

 

    



                 Hyaline casts,     NONE SEEN       NONE SEEN /LPF     [facility] 



                           UA                        [facility] 



                                         [place] 

 

    



                     Reflex              NO CULTURE INDICATED      [facility] 



                                         [place] 













                                         Specimen

 











 



                           Narrative                 Performed At

 

 



                           FASTING:YES               [facility]



                                         FASTING: YES 









                                        Resulting Agency Comment

 

                                        



Performing Organization Information:



Site ID: [facility]



Name: [facility]



Address: [address] [place] [id number]



Director: Asia Olivo









   



                 Performing Organization     Address         City/[facility] Centercode     Phone Number

 

   



                                         [facility] [place]     [address] [place] [address] [id number] [phone]







* TSH reflex to T4 (2018 11:12 AM CST)





    



              Component     Value        Ref Range     Performed At     Pathologist



                                         [facility]

 

    



                 TSH reflex to     0.71            0.40 - 4.50 mIU/L     [facility] 



                           FT4                       [facility] 



                                         [place] 













                                         Specimen

 





                                         Blood









 



                           Narrative                 Performed At

 

 



                           FASTING:YES               [facility]



                                         FASTING: YES 









                                        Resulting Agency Comment

 

                                        



Performing Organization Information:



Site ID: [facility]



Name: [facility]



Address: [address] [place] [address]



Director: Asia Olivo









   



                 Performing Organization     Address         City/State/[facility] Centercode     Phone Number

 

   



                                         [facility] [place]     [address] [place] [address]     [phone]







* Testosterone level, free and total, male (2018 11:12 AM CST)





    



              Component     Value        Ref Range     Performed At     Pathologist



                                         [facility]

 

    



                 Testosterone,     449             250 - 1,100 ng/dL     [facility] 



                           total, lc/ms/ms           [facility] 

 

    



                 Testosterone,     73.5            35.0 - 155.0 pg/mL     [facility] 



                     free                Comment:            [facility] 



                           **Data from J Clin Invest      JACOBSEN 



                           1974:[id number] and J Clin      CASTILLO 



                                         Endocrinol   



                                         Metab 1973;36:[id number]. Men   



                                         with clinically significant   



                                         hypogonadal symptoms and   



                                         testosterone values repeatedly   



                                         in the   



                                         range of the 200-300 ng/dL or   



                                         less, may benefit from   



                                         testosterone   



                                         treatment after adequate risk   



                                         and benefits counseling.   



                                         For additional information,   



                                         please refer to   



                                         http://education.[employer].com/faq/IBA314 (This link   



                                         is   



                                         being provided for   



                                         informational/ educational   



                                         purposes only.)   



                                         This test was developed and   



                                         its analytical performance   



                                         characteristics have been   



                                         determined by [facility]. It has not   



                                         been cleared or approved by   



                                         the [place]   



                                         Food and Drug Administration.   



                                         This assay has been validated   



                                         pursuant to the CLIA   



                                         regulations and is used for   



                                         clinical   



                                         purposes.   













                                         Specimen

 











 



                           Narrative                 Performed At

 

 



                           FASTING:YES               [facility]



                                         FASTING: YES 









                                        Resulting Agency Comment

 

                                        



Performing Organization Information:



Site ID: [id number]



Name: [facility]



Address: [address] [place] [address]



Director: Francis José M.D., Ph.D









   



                 Performing Organization     Address         City/[facility]     Phone Number

 

   



                                         [facility]     [address] [place] [address]     [phone]





                                         [facility]   





* Prostate specific antigen (2018 11:12 AM CST)





    



              Component     Value        Ref Range     Performed At     Pathologist



                                         Signature

 

    



                 PSA             0.5             < OR=4.0 ng/mL     [facility] 



                           Comment:                  [facility] 



                           The total PSA value from this      [place] 



                                         assay system is   



                                         standardized against the WHO   



                                         standard. The test   



                                         result will be approximately   



                                         20% lower when compared   



                                         to the equimolar-standardized   



                                         total PSA (Eleuterio   



                                         [place]). Comparison of serial   



                                         PSA results should be   



                                         interpreted with this fact in   



                                         mind.   



                                         This test was performed using   



                                         the Siemens   



                                         chemiluminescent method.   



                                         Values obtained from   



                                         different assay methods cannot   



                                         be used   



                                         interchangeably. PSA levels,   



                                         regardless of   



                                         value, should not be   



                                         interpreted as absolute   



                                         evidence of the presence or   



                                         absence of disease.   













                                         Specimen

 











 



                           Narrative                 Performed At

 

 



                           FASTING:YES               [facility]



                                         FASTING: YES 









                                        Resulting Agency Comment

 

                                        



Performing Organization Information:



Site ID: RGA



Name: [facility]



Address: [address] [place] [id number]



Director: Asia Olivo









   



                 Performing Organization     Address         City/[facility]     Phone Number

 

   



                                         [facility]   

 

   



                 [place] [address]     [phone]







* Creatine kinase, total (CPK) (2018 11:12 AM CST)





    



              Component     Value        Ref Range     Performed At     Pathologist



                                         Signature

 

    



                 Creatine kinase     114             44 - 196 U/L     [facility] 













                                         Specimen

 











 



                           Narrative                 Performed At

 

 



                           FASTING:YES               [facility]



                                         FASTING: YES 









                                        Resulting Agency Comment

 

                                        



Performing Organization Information:



Site ID: [id number]



Name: [facility]



Address: [address] [place] [id number]



Director: Asia Olivo









   



                 Performing Organization     Address         City/[facility] Cent[facility]     Phone Number

 

   



                                         [facility]   

 

   



                 [place] [address]     [phone]







* Lipid panel (2018 11:12 AM CST)





    



              Component     Value        Ref Range     Performed At     Pathologist



                                         Signature

 

    



                 Cholesterol,     201 (H)         <200 mg/dL      [facility] 



                           total                     [facility] 

 

    



                 HDL cholesterol     56              >40 mg/dL       [facility] 

 

    



                 Triglycerides     129             <150 mg/dL      [facility] 

 

    



                 LDL cholesterol     121 (H)         mg/dL (calc)     [facility] 



                     calculated          Comment:            [facility] 



                           Reference range: <100      [place] 



                                         Desirable range <100 mg/dL for   



                                         primary prevention;   



                                         <70 mg/dL for patients with   



                                         CHD or diabetic patients   



                                         with > or=2 CHD risk factors.   



                                         LDL-C is now calculated using   



                                         the Tyrone-Whitney   



                                         calculation, which is a   



                                         validated novel method   



                                         providing   



                                         better accuracy than the   



                                         Friedewald equation in the   



                                         estimation of LDL-C.   



                                         Tyrone SS et al. JARED.   



                                         2013;310(19): 8098-8739   



                                         (http://education.CLARED.OnePIN/faq/PBH992)   

 

    



                 Cholesterol/HDL     3.6             <5.0 (calc)     [facility] 



                           ratio                     [facility] 



                                         [place] 

 

    



                 Non-HDL         145 (H)         <130 mg/dL (calc)     [facility] 



                     cholesterol         Comment:            [facility] 



                           For patients with diabetes      [place] 



                                         plus 1 major ASCVD risk   



                                         factor, treating to a   



                                         non-HDL-C goal of <100 mg/dL   



                                         (LDL-C of <70 mg/dL) is   



                                         considered a therapeutic   



                                         option.   













                                         Specimen

 











 



                           Narrative                 Performed At

 

 



                           FASTING:YES               [facility]



                                         FASTING: YES 









                                        Resulting Agency Comment

 

                                        



Performing Organization Information:



Site ID: [facility]GA



Name: [facility]



Address: [address] [place] [id number]



Director: Asia Olivo









   



                 Performing Organization     Address         City/State/Zipcode     Phone Number

 

   



                                         [facility] [place]     [address] [place] [address] [phone]







after 2018



Insurance







                                        Type



            Payer      Benefit     Subscriber ID     Effective     Phone      Address 



                           Plan /                    Dates   



                                         Group     

 

                                        HMO



                 [employer]ET[employer]           [employer]           xxxxxxxxxx      2006-P   



                           HMO,POS,EP                resent   



                                         O, [employer]     









     



            Guarantor Name     Account     Relation to     Date of     Phone      Billing Address



                     Type                Patient             Birth  

 

     



            Pieter Thomas     Personal/F     Self       1965     [phone] [address]

 [address]               (Home)              [place] [address]







Advance Directives





Patient has advance care planning documents, and code status on file. For more i
nformation, please contact:



Jose R Walker



[address] [place] [address]









                          Date Inactivated          Comments



                           Code Status               Date Activated  

 

                          2018  4:59 PM        



                           Full Code                 12/10/2018  2:01 AM  









  



                           Code Status decision reached by:     Patient

## 2019-08-08 NOTE — DIAGNOSTIC IMAGING REPORT
EXAMINATION:  CHEST SINGLE (PORTABLE)    



INDICATION: 



COMPARISON:  None

     

FINDINGS:

TUBES and LINES:  None.



LUNGS:  Lungs are well inflated. Left basilar linear atelectasis versus

scarring. There is no evidence of pneumonia or pulmonary edema.



PLEURA:  No pleural effusion or pneumothorax.



HEART AND MEDIASTINUM:  The cardiomediastinal silhouette is unremarkable. 



BONES AND SOFT TISSUES:  No acute osseous lesion. 



UPPER ABDOMEN: No free air under the diaphragm. 



IMPRESSION: 

No acute thoracic abnormality.





Signed by: Dr. CHEKO Coats M.D. on 8/8/2019 11:29 PM

## 2019-08-08 NOTE — XMS REPORT
Continuity of Care Document

                             Created on: 2019



LONI GLEASON

External Reference #: [id number]

: 1965

Sex: Male



Demographics







                          Address                   [address] [place] [address] 

[place]  [address]

 

                          Home Phone                [phone]

 

                          Preferred Language        English

 

                          Marital Status            Unknown

 

                          Restoration Affiliation     Unknown

 

                          Race                      Unknown

 

                          Ethnic Group              Unknown





Author







                          Author                    [employer]

 

                          Address                   Unknown

 

                          Phone                     Unavailable







Care Team Providers







                    Care Team Member Name    Role                Phone

 

                    [employer]    Unavailable         Unavailable



                                    



Problems

                    





                    Problem                            Status                            Onset Date     

                          Classification                            Date Reported       

                          Comments                            Source                    

 

                    Ulcerative colitis                                                        2019

                            Diagnosis                            2019          

                                                      [facility]                

    

 

                          Body mass index 25-29 - overweight                                                

                    2019                            Diagnosis                            2019

                                                        [facility]      

              

 

                    Depression screening                                                        2019

                            Diagnosis                            2019          

                                                      [facility]                

    

 

                    Acute meniscal tear, lateral                                                        

2019                            Diagnosis                            2019

                                                        [facility]      

              

 

                    Generalized anxiety disorder                                                        

2019                            Diagnosis                            2019

                                                        [facility]      

              

 

                    Chronic insomnia                                                        2019  

                          Diagnosis                            2019            

                                                      [facility]                  

  

 

                    Generalized Anxiety Disorder                                                        

2019                            Problem                            2019  

                                                      [facility]        

            

 

                    Ulcerative Colitis                                                        2019

                            Problem                            2019            

                                                      [facility]                  

  

 

                    Chronic Insomnia                                                        2019  

                          Problem                            2019              

                                                      [facility]                    



 

                    097 - OTH   UNSPEC SY                            Active                            2015

                                                                                       

                                         [facility]                    

 

                    724.2 - LUMBAGO                            Active                            2015

                                                                                       

                                         [facility]                    

 

                    Cough                            Active                                             

                    Problem                            2019                               

                                         [facility]                    

 

                    Sore throat                            Active                                       

                          Problem                            2019                       

                                                       [facility]                    

 

                          Elevated blood pressure reading                            Active                 

                                                Problem                            2019   

                                                       [facility]                

    

 

                    PND (Confirmed)                            Active                                   

                          Problem                            2019                   

                                                       [facility]                    

 

                    Sinusitis                            Active                                         

                          Problem                            2019                         

                                                       [facility]                    



                                                                                
                                                                                
                                                                                
                                                                     



Medications

                    





                    Medication                            Details                            Route      

                          Status                            Patient Instructions         

                          Ordering Provider                            Order Date           

                                        Source                    

 

                          benzonatate 200 MG Oral Capsule [Tessalon]                            200 mg=1 cap,

 PO, TID, X 10 day, # 30 cap, 0 Refill(s), Pharmacy: [facility]/pharmacy #[id number]         
                                                Active                                  

                                                      2019                         

                                         [facility]                    

 

                          Azithromycin 5 Day Dose Pack 250 mg oral tablet                            See Instructions,

 Take 2 tablets by mouth the first day then 1 tablet by mouth days 2-5., X 5 
day, # 6 tab, 0 Refill(s), Pharmacy: [facility]/pharmacy #[id number]                            

                            Active                                                   

                                                2019                            [facility]

                    

 

                                        Azelastine hydrochloride 0.137 MG/ACTUAT Metered Dose Nasal Spray [Astelin]     

                                        2 spray, NASAL, BID, # 1 ea, 0 Refill(s), Pharmacy: [facility]/pharmacy

 #[id number]                                                        Active                 

                                                                            2018          

                                        [facility]                    

 

                                        Codeine Phosphate 2 MG/ML / Guaifenesin 20 MG/ML Oral Solution                  

                                        10 mL, PO, Bedtime, PRN for cough, X 7 day, # 100 mL, 0 Refill(s)        

                                                      No Longer Active                     

                                                                            2018              

                                        [facility]                    

 

                          Oseltamivir 75 MG Oral Capsule [Tamiflu]                            75 mg, PO, Q12H,

 X 5 day, # 10 cap, 0 Refill(s)                                                    

                    No Longer Active                                                                   

                          2018                            [facility]              

      

 

                                        Azelastine hydrochloride 0.137 MG/ACTUAT Metered Dose Nasal Spray               

                                        azelastine 137 mcg (0.1 %) nasal spray aerosol Spray 1 spray every day

 by nasal route as needed for 30 days.                                             

                    Active                                                                      

                                                      [facility]                    



 

                          Escitalopram 10 MG Oral Tablet                            escitalopram 10 mg tablet

 take 1 tablet po daily                                                        Active

                                                                                       

                                        [facility]                    

 

                                        Acetaminophen 325 MG / Hydrocodone Bitartrate 5 MG Oral Tablet                  

                                        hydrocodone 5 mg-acetaminophen 325 mg tablet Take 1 tablet every 6 hours 

by oral route.                                                        Active         

                                                                                        

                                        [facility]                    

 

                          Prednisone 10 MG Oral Tablet                            prednisone 10 mg tablet Take

 1 tablet every day by oral route.                                                 

                    Active                                                                          

                                                      [facility]                    

 

                          Zolpidem tartrate 10 MG Oral Tablet                            zolpidem 10 mg tablet

 Take 1 tablet every day by oral route.                                            

                    [facility]                   

 



                                                                                
                                                                                
                                                                    



Allergies, Adverse Reactions, Alerts

                    





                    Substance                            Category                            Reaction   

                          Severity                            Reaction type           

                          Status                            Date Reported                     

                          Comments                            Source                    

 

                          No Known Medication Allergies                            Assertion                

                                                                            Drug allergy       

                                                                                       

                                        [facility]                    



                                                        



Immunizations

                    





                    Immunization                            Date Given                            Site  

                          Status                            Last Updated             

                          Comments                            Source                    

 

                          influenza virus vaccine, inactivated                            10/08/2018        

                                                completed                            [facility]             

       

 

                          influenza virus vaccine, inactivated                            10/01/2017        

                                                completed                            [facility]             

       



                                                             



Results







                                        No Data Provided for This Section



                    



Pathology Reports







                                        No Data Provided for This Section                    



                                                



Diagnostic Reports

                    





                    Report                            Value                            Date             

                                        Source                    

 

                          Spine lumbar wo contrast MRI                            MRI LUMBAR SPINE WITHOUT CONTRAST

 

 

COMPARISON: 2015 radiograph exam.

 

TECHNIQUE: Sagittal T1, sagittal T2 with fat saturation, axial T1 and axial T2 
images were obtained. No intravenous gadolinium was given.

 

FINDINGS: 

 

The paravertebral soft tissues are normal. 

 

The conus medullaris terminates at the L1 level. Multilevel thoracolumbar spine 
endplate Schmorl's nodes are identified, without marrow edema.

 

T12-L1: Unremarkable.

 

L1-L2: Unremarkable.

 

L2-L3: No central canal stenosis. There is mild bilateral foraminal and 
extraforaminal disc bulge with mild bilateral foraminal stenosis. No exiting 
nerve root impingement is present.

 

L3-L4: No central canal stenosis. Mild bilateral foraminal stenosis due to disc 
bulge encroachment is present. No significant mass effect on the exiting nerve 
roots.

 

L4-L5: Mild disc bulge is present. No central canal stenosis is present. Mild 
bilateral foraminal stenosis is present with disc bulge contacting the bilateral
 L4 exiting nerve roots.

 

L5-S1: Minimal disc bulge is seen without thecal sac stenosis. No foraminal 
stenosis.

 

 

 

IMPRESSION:

                                        1. Several levels of mild foraminal stenosis without significant mass effect on 

the exiting nerve roots. No focal disc herniation or significant central canal 
stenosis.

 

                            2015                             [facility] [place]

                    

 

                          Spine lumbar 2 or 3 views DX                            EXAMINATION: Lumbar spine 

- 2 to 3 views

 

HISTORY: Lumbago.

 

FINDINGS: Frontal, lateral, and coned lateral views of the lumbar spine are 
performed without comparison.

 

The alignment is normal without listhesis. The intervertebral disc spaces are 
normal. The vertebral body heights are normal without compression fracture. The 
sacral ala appear intact.

 

IMPRESSION:

 

                                        1. Normal alignment and intervertebral disc spaces of the lumbar spine.

                            2015                             [facility] 

                   



                                                                                
    



Consultation Notes

                    





                                        No Data Provided for This Section                    



                                                            



Discharge Summaries

                    





                                        No Data Provided for This Section                    



                                                            



History and Physicals

                    





                                        No Data Provided for This Section                    



                                                                



Vital Signs

                     





                    Vital Sign                            Value                            Date         

                          Comments                            Source                    

 

                    Weight                            106.455                             2019    

                                                       [facility]                 

   

 

                    Height                            190.5 cm                            2019    

                                                       [facility]                 

   

 

                    Heart Rate                            86                             2019     

                                                       [facility]                  

  

 

                    Respitory Rate                            16                             2019 

                                                        [facility]              

      

 

                    Temperature Oral (F)                            97.9 F                            2019

                                                         [facility]             

       

 

                    Systolic (mm Hg)                            151                             2019

                                                         [facility]             

       

 

                    Diastolic (mm Hg)                            94                             2019

                                                         [facility]             

       

 

                    BMI Calculated                            29.33                             2019

                                                         [facility]             

       

 

                    Diastolic (mm Hg)                            88                             2019

                                                        [facility]      

              

 

                    Height                            75                             2019         

                                                      [facility]               

     

 

                    Systolic (mm Hg)                            140                             2019

                                                        [facility]      

              

 

                    Weight                            222.6                             2019      

                                                      [facility]            

        

 

                    Systolic (mm Hg)                            110                             2018

                                                         [facility]             

       

 

                    Diastolic (mm Hg)                            73                             2018

                                                         [facility]             

       

 

                    Heart Rate                            96                             2018     

                                                       [facility]                  

  

 

                    Temperature Oral (F)                            98 F                            2018

                                                         [facility]             

       

 

                    BMI Calculated                            26.43                             2018

                                                         [facility]             

       

 

                    Weight                            95.909                             2018     

                                                       [facility]                  

  

 

                    Height                            190.5 cm                            2018    

                                                       [facility]                 

   



                                                                                
                                                                                
                                                                                
                                                                                
                                                        



Encounters

                    





                    Location                            Location Details                            Encounter

 Type                            Encounter Number                            Reason For

 Visit                            Attending Provider                            ADM Date

                            DC Date                            Status                

                                        Source                    

 

                          [facility]                                              

                          Outpt Diag Services                            [id number]                

                                                [facility]                             2015                                               

                                         [facility] [place]                                          

                          Outpt Diag Services                            [id number]            

                                                      [facility]                          

                    2015                                    

                                        [facility] [place]                    

 

                                                                            Outpatient                  

                    [id number]                                                        RASHAAD QUEVEDO                             2016                                      

                          [facility]                 

   

 

                                                                            Outpatient                  

                    [id number]                                                        EVE HOWELL

                             2017                                              

                          [facility]                    

 

                                                                            Outpatient                  

                    [id number]                                                        EVE HOWELL

                             2018                                              

                          [facility]                                                     

                    Outpatient                            [id number]                                  

                                                        2018                       

                    01/10/2018                                                         [facility]

                    

 

                          [place] [facility]                               

                                        Keyon King MD: [id number], [address], [place] [address], Ph. [phone]                            [id number]

                                                        Keyon King              

                    2019                                                              

                                        [facility]                    

 

                                                                            Outpatient                  

                    [id number]                                                        Eve Howell

                             2019                                              

                          [facility]                                                     

                    Outpatient                            [id number]                                  

                                                        2019                                                         [facility]

                    



                                                                                
                                                                                
        



Procedures

                    





                    Procedure                            Code                            Date           

                          Perfomer                            Comments                        

                                        Source                    

 

                          Knee joint operation                            [id number]                         

                                                                                                    

 Medical Group                    

 

                    Nose operation                            [id number]                                  

                                                                               Medical

 Group                    

 

                          Shoulder joint operations                            [id number]                    

                                                                                                   

                                         Medical Group                    

 

                    Tonsillectomy                            [id number]                                  

                                                                               Medical

 Group                    



                                                                                
                            



Assessment and Plan

                    





                                        No Data Provided for This Section                    



                                     



Plan of Care







                                        No Data Provided for This Section                    



                                                                



Social History

                    





                    Social History                            Date                            Source    

                

 

                                                                                    Smoking Status

             Never Smoker

                                   

                                 2019                            [facility]                    

 

                                        Social History TypeResponse

Substance Abuse

Use: None.

Exercise

Exercise frequency: Daily.

Employment/School

Status: Employed.  Work/School description: procurement.

Alcohol

Current

Smoking Status

Never smoker; Exposure to Tobacco Smoke None; Cigarette Smoking Last 365 Days 
No; Reg Smoking Cessation Counseling No

entered on: 2018                             Medical Group   

                 

 

                          No data available for this section                            2015          

                                         [facility] [place]                    

 

                          No data available for this section                            2015          

                                         [facility] [place]                    



                                                                                
                                    



Family History

                    





                                        No Data Provided for This Section                    



                                                            



Advance Directives

                    





                                        No Data Provided for This Section                    



                                                            



Functional Status

                    





                                        No Data Provided for This Section

## 2019-08-09 RX ADMIN — HYDROMORPHONE HYDROCHLORIDE ONE MG: 2 INJECTION INTRAMUSCULAR; INTRAVENOUS; SUBCUTANEOUS at 02:54

## 2019-08-29 ENCOUNTER — HOSPITAL ENCOUNTER (OUTPATIENT)
Dept: HOSPITAL 88 - PT | Age: 54
LOS: 2 days | End: 2019-08-31
Attending: ORTHOPAEDIC SURGERY
Payer: COMMERCIAL

## 2019-08-29 DIAGNOSIS — M62.81: ICD-10-CM

## 2019-08-29 DIAGNOSIS — S83.200D: Primary | ICD-10-CM

## 2019-08-29 DIAGNOSIS — M25.561: ICD-10-CM

## 2019-08-29 DIAGNOSIS — R26.9: ICD-10-CM

## 2019-08-29 PROCEDURE — 97110 THERAPEUTIC EXERCISES: CPT

## 2019-08-29 PROCEDURE — 97010 HOT OR COLD PACKS THERAPY: CPT

## 2019-08-29 PROCEDURE — 97161 PT EVAL LOW COMPLEX 20 MIN: CPT

## 2019-09-24 ENCOUNTER — HOSPITAL ENCOUNTER (OUTPATIENT)
Dept: HOSPITAL 88 - PT | Age: 54
LOS: 6 days | End: 2019-09-30
Attending: ORTHOPAEDIC SURGERY
Payer: COMMERCIAL

## 2019-09-24 DIAGNOSIS — S83.200D: Primary | ICD-10-CM

## 2019-09-24 DIAGNOSIS — M25.561: ICD-10-CM

## 2019-09-24 DIAGNOSIS — R26.9: ICD-10-CM

## 2019-09-24 DIAGNOSIS — M62.81: ICD-10-CM

## 2019-09-24 PROCEDURE — 97139 UNLISTED THERAPEUTIC PX: CPT

## 2020-05-20 ENCOUNTER — HOSPITAL ENCOUNTER (OUTPATIENT)
Dept: HOSPITAL 88 - PT | Age: 55
LOS: 11 days | End: 2020-05-31
Attending: ORTHOPAEDIC SURGERY
Payer: COMMERCIAL

## 2020-05-20 DIAGNOSIS — S83.200D: Primary | ICD-10-CM

## 2020-05-20 DIAGNOSIS — M25.561: ICD-10-CM

## 2020-05-20 DIAGNOSIS — R26.9: ICD-10-CM

## 2020-05-20 DIAGNOSIS — M62.81: ICD-10-CM
